# Patient Record
Sex: FEMALE | Race: WHITE | NOT HISPANIC OR LATINO | URBAN - METROPOLITAN AREA
[De-identification: names, ages, dates, MRNs, and addresses within clinical notes are randomized per-mention and may not be internally consistent; named-entity substitution may affect disease eponyms.]

---

## 2021-01-04 ENCOUNTER — IMMUNIZATIONS (OUTPATIENT)
Dept: FAMILY MEDICINE CLINIC | Facility: HOSPITAL | Age: 71
End: 2021-01-04

## 2021-01-04 DIAGNOSIS — Z23 ENCOUNTER FOR IMMUNIZATION: ICD-10-CM

## 2021-01-04 PROCEDURE — 0011A SARS-COV-2 / COVID-19 MRNA VACCINE (MODERNA) 100 MCG: CPT

## 2021-01-04 PROCEDURE — 91301 SARS-COV-2 / COVID-19 MRNA VACCINE (MODERNA) 100 MCG: CPT

## 2021-02-08 ENCOUNTER — IMMUNIZATIONS (OUTPATIENT)
Dept: FAMILY MEDICINE CLINIC | Facility: HOSPITAL | Age: 71
End: 2021-02-08

## 2021-02-08 DIAGNOSIS — Z23 ENCOUNTER FOR IMMUNIZATION: Primary | ICD-10-CM

## 2021-02-08 PROCEDURE — 0012A SARS-COV-2 / COVID-19 MRNA VACCINE (MODERNA) 100 MCG: CPT

## 2021-02-08 PROCEDURE — 91301 SARS-COV-2 / COVID-19 MRNA VACCINE (MODERNA) 100 MCG: CPT

## 2021-10-29 ENCOUNTER — APPOINTMENT (OUTPATIENT)
Dept: URGENT CARE | Facility: CLINIC | Age: 71
End: 2021-10-29

## 2021-10-29 ENCOUNTER — APPOINTMENT (OUTPATIENT)
Dept: LAB | Facility: CLINIC | Age: 71
End: 2021-10-29

## 2021-10-29 DIAGNOSIS — Z02.1 PRE-EMPLOYMENT HEALTH SCREENING EXAMINATION: ICD-10-CM

## 2021-10-29 DIAGNOSIS — Z02.1 PRE-EMPLOYMENT HEALTH SCREENING EXAMINATION: Primary | ICD-10-CM

## 2021-10-29 PROCEDURE — 36415 COLL VENOUS BLD VENIPUNCTURE: CPT

## 2021-10-29 PROCEDURE — 86480 TB TEST CELL IMMUN MEASURE: CPT

## 2021-10-29 PROCEDURE — 86787 VARICELLA-ZOSTER ANTIBODY: CPT

## 2021-10-29 PROCEDURE — 86735 MUMPS ANTIBODY: CPT

## 2021-10-29 PROCEDURE — 86762 RUBELLA ANTIBODY: CPT

## 2021-10-29 PROCEDURE — 86765 RUBEOLA ANTIBODY: CPT

## 2021-10-30 LAB — RUBV IGG SERPL IA-ACNC: >175 IU/ML

## 2021-11-01 LAB
MEV IGG SER QL: NORMAL
MUV IGG SER QL: NORMAL

## 2021-11-02 LAB
GAMMA INTERFERON BACKGROUND BLD IA-ACNC: 0.03 IU/ML
M TB IFN-G BLD-IMP: NEGATIVE
M TB IFN-G CD4+ BCKGRND COR BLD-ACNC: 0 IU/ML
M TB IFN-G CD4+ BCKGRND COR BLD-ACNC: 0 IU/ML
MITOGEN IGNF BCKGRD COR BLD-ACNC: >10 IU/ML
VZV IGG SER IA-ACNC: NORMAL

## 2023-07-18 ENCOUNTER — OFFICE VISIT (OUTPATIENT)
Age: 73
End: 2023-07-18
Payer: COMMERCIAL

## 2023-07-18 VITALS
HEIGHT: 62 IN | HEART RATE: 101 BPM | BODY MASS INDEX: 30.36 KG/M2 | RESPIRATION RATE: 18 BRPM | DIASTOLIC BLOOD PRESSURE: 80 MMHG | TEMPERATURE: 98.8 F | SYSTOLIC BLOOD PRESSURE: 136 MMHG | OXYGEN SATURATION: 96 % | WEIGHT: 165 LBS

## 2023-07-18 DIAGNOSIS — S80.862A INSECT BITE OF LEFT LOWER LEG, INITIAL ENCOUNTER: Primary | ICD-10-CM
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
## 2023-07-18 PROCEDURE — 99203 OFFICE O/P NEW LOW 30 MIN: CPT | Performed by: PHYSICIAN ASSISTANT

## 2023-07-18 RX ORDER — METHYLPREDNISOLONE 4 MG/1
TABLET ORAL
Qty: 21 EACH | Refills: 0 | Status: SHIPPED | OUTPATIENT
Start: 2023-07-18 | End: 2023-07-24

## 2023-07-18 RX ORDER — CEPHALEXIN 500 MG/1
500 CAPSULE ORAL EVERY 8 HOURS SCHEDULED
Qty: 21 CAPSULE | Refills: 0 | Status: SHIPPED | OUTPATIENT
Start: 2023-07-18 | End: 2023-07-25

## 2023-07-18 NOTE — PROGRESS NOTES
[facility]        NAME: Matthew Jeffery is a 68 y.o. female  : 1950    MRN: [id number]  DATE: 2023  TIME: 5:49 PM    Assessment and Plan   Insect bite of left lower leg, initial encounter [S80.105U, W57. XXXA]  1. Insect bite of left lower leg, initial encounter  mupirocin (BACTROBAN) 2 % ointment    cephalexin (KEFLEX) 500 mg capsule    methylPREDNISolone 4 MG tablet therapy pack            Patient Instructions     Patient has what appears to be insect bite of left lower leg. Patient concerned because erythema is migrating. I we will place her on a combination of oral Keflex and a Medrol Dosepak and prescribed her mupirocin ointment. She is to keep the wound otherwise clean and dry. Should be reevaluated if condition persists or worsens despite treatment. Follow up with PCP in 3-5 days. Proceed to  ER if symptoms worsen. Chief Complaint     Chief Complaint   Patient presents with   • Insect Bite     Insect bug since Sat that has been getting bigger. Redness, swelling and pain present          History of Present Illness       Patient presents with what she believes is an insect bite wound on her left lower leg which occurred 3 days ago. She reports the area is red, swollen, and tender but there is no drainage or bleeding, red streaking, fever, chills, or other symptoms. Denies any foreign body or insect remnant. Review of Systems   Review of Systems   Constitutional: Negative. Respiratory: Negative. Cardiovascular: Negative. Gastrointestinal: Negative. Genitourinary: Negative. Skin: Positive for color change and wound.         Possible insect bite left lower leg         Current Medications       Current Outpatient Medications:   •  cephalexin (KEFLEX) 500 mg capsule, Take 1 capsule (500 mg total) by mouth every 8 (eight) hours for 7 days, Disp: 21 capsule, Rfl: 0  •  methylPREDNISolone 4 MG tablet therapy pack, Use as directed on package, Disp: 21 each, Rfl: 0  •  mupirocin (BACTROBAN) 2 % ointment, Apply topically 2 (two) times a day, Disp: 22 g, Rfl: 0    Current Allergies     Allergies as of 07/18/2023   • (No Known Allergies)            The following portions of the patient's history were reviewed and updated as appropriate: allergies, current medications, past family history, past medical history, past social history, past surgical history and problem list.     History reviewed. No pertinent past medical history. History reviewed. No pertinent surgical history. History reviewed. No pertinent family history. Medications have been verified. Objective   /80   Pulse 101   Temp 98.8 °F (37.1 °C) (Tympanic)   Resp 18   Ht 5' 2" (1.575 m)   Wt 74.8 kg (165 lb)   SpO2 96%   BMI 30.18 kg/m²   No LMP recorded. Patient is postmenopausal.       Physical Exam     Physical Exam  Vitals reviewed. Constitutional:       General: She is not in acute distress. Appearance: She is well-developed. Skin:     Comments: Left calf with what appears to be insect bite wound with surrounding erythema and tenderness to palpation. Center wound is red and swollen but there is no active drainage or bleeding and no sign of abscess formation. Neurological:      Mental Status: She is alert and oriented to person, place, and time.

## 2023-07-18 NOTE — PATIENT INSTRUCTIONS
Insect Bite or Sting   WHAT YOU NEED TO KNOW:   Most insect bites and stings are not dangerous and go away without treatment. Your symptoms may be mild, or you may develop anaphylaxis. Anaphylaxis is a sudden, life-threatening reaction that needs immediate treatment. Common examples of insects that bite or sting are bees, ticks, mosquitoes, spiders, and ants. Insect bites or stings can lead to diseases such as malaria, West Nile virus, Lyme disease, or [place] Spotted Fever. DISCHARGE INSTRUCTIONS:   Call your local emergency number (911 in the [address]) for signs or symptoms of anaphylaxis,  such as trouble breathing, swelling in your mouth or throat, or wheezing. You may also have itching, a rash, hives, or feel like you are going to faint. Return to the emergency department if:   You are stung on your tongue or in your throat. A white area forms around the bite. You are sweating badly or have body pain. You think you were bitten or stung by a poisonous insect. Call your doctor if:   You have a fever. The area becomes red, warm, tender, and swollen beyond the area of the bite or sting. You have questions or concerns about your condition or care. Medicines: You may need any of the following:  Antihistamines  decrease itching and rash. Epinephrine  is used to treat severe allergic reactions such as anaphylaxis. Take your medicine as directed. Contact your healthcare provider if you think your medicine is not helping or if you have side effects. Tell your provider if you are allergic to any medicine. Keep a list of the medicines, vitamins, and herbs you take. Include the amounts, and when and why you take them. Bring the list or the pill bottles to follow-up visits. Carry your medicine list with you in case of an emergency. Steps to take for signs or symptoms of anaphylaxis:   Immediately  give 1 shot of epinephrine only into the outer thigh muscle.          Leave the shot in place as directed. Your healthcare provider may recommend you leave it in place for up to 10 seconds before you remove it. This helps make sure all of the epinephrine is delivered. Call 911 and go to the emergency department,  even if the shot improved symptoms. Do not drive yourself. Bring the used epinephrine shot with you. Safety precautions to take if you are at risk for anaphylaxis:   Keep 2 shots of epinephrine with you at all times. You may need a second shot, because epinephrine only works for about 20 minutes and symptoms may return. Your healthcare provider can show you and family members how to give the shot. Check the expiration date every month and replace it before it expires. Create an action plan. Your healthcare provider can help you create a written plan that explains the allergy and an emergency plan to treat a reaction. The plan explains when to give a second epinephrine shot if symptoms return or do not improve after the first. Give copies of the action plan and emergency instructions to family members, work and school staff, and  providers. Show them how to give a shot of epinephrine. Carry medical alert identification. Wear medical alert jewelry or carry a card that says you have an insect allergy. Ask your healthcare provider where to get these items. If an insect bites or stings you:   Remove the stinger. Scrape the stinger out with your fingernail, edge of a credit card, or a knife blade. Do not squeeze the wound. Gently wash the area with soap and water. Remove the tick. Ticks must be removed as soon as possible so you do not get diseases passed through tick bites. Ask your healthcare provider for more information on tick bites and how to remove ticks. Care for a bite or sting wound:   Elevate (raise) the area above the level of your heart, if possible. Prop the area on pillows to keep it raised comfortably.  Elevate the area for 10 to 20 minutes each hour or as directed by your healthcare provider. Use compresses. Soak a clean washcloth in cold water, wring it out, and put it on the bite or sting. Use the compress for 10 to 20 minutes each hour or as directed by your healthcare provider. After 24 to 48 hours, change to warm compresses. Apply a paste. Add water to baking soda to make a thick paste. Put the paste on the area for 5 minutes. Rinse gently to remove the paste. Prevent another insect bite or sting:   Do not wear bright-colored or flower-print clothing when you plan to spend time outdoors. Do not use hairspray, perfumes, or aftershave. Do not leave food out. Empty any standing water and wash container with soap and water every 2 days. Put screens on all open windows and doors. Put insect repellent that contains DEET on skin that is showing when you go outside. Put insect repellent at the top of your boots, bottom of pant legs, and sleeve cuffs. Wear long sleeves, pants, and shoes. Use citronella candles outdoors to help keep mosquitoes away. Put a tick and flea collar on pets. Follow up with your doctor as directed:  Write down your questions so you remember to ask them during your visits. © Copyright Michele Dunn 2022 Information is for End User's use only and may not be sold, redistributed or otherwise used for commercial purposes. The above information is an  only. It is not intended as medical advice for individual conditions or treatments. Talk to your doctor, nurse or pharmacist before following any medical regimen to see if it is safe and effective for you.

## 2023-08-09 ENCOUNTER — OFFICE VISIT (OUTPATIENT)
Age: 73
End: 2023-08-09
Payer: COMMERCIAL

## 2023-08-09 VITALS
HEIGHT: 62 IN | TEMPERATURE: 98.2 F | WEIGHT: 165 LBS | BODY MASS INDEX: 30.36 KG/M2 | SYSTOLIC BLOOD PRESSURE: 138 MMHG | DIASTOLIC BLOOD PRESSURE: 80 MMHG | OXYGEN SATURATION: 97 % | HEART RATE: 107 BPM | RESPIRATION RATE: 18 BRPM

## 2023-08-09 DIAGNOSIS — M54.2 NECK PAIN ON RIGHT SIDE: Primary | ICD-10-CM

## 2023-08-09 PROCEDURE — 99213 OFFICE O/P EST LOW 20 MIN: CPT | Performed by: EMERGENCY MEDICINE

## 2023-08-09 RX ORDER — CYCLOBENZAPRINE HCL 10 MG
10 TABLET ORAL 3 TIMES DAILY PRN
Qty: 20 TABLET | Refills: 0 | Status: SHIPPED | OUTPATIENT
Start: 2023-08-09

## 2023-08-09 NOTE — PATIENT INSTRUCTIONS
Neck Pain   WHAT YOU NEED TO KNOW:   You may have sudden neck pain that increases quickly. You may instead feel pain build slowly over time. Neck pain may go away in a few days or weeks, or it may continue for months. The pain may come and go, or be worse with certain movements. The pain may be only in your neck, or it may move to your arms, back, or shoulders. You may also have pain that starts in another body area and moves to your neck. Some types of neck pain are permanent. DISCHARGE INSTRUCTIONS:   Return to the emergency department if:   You have an injury that causes neck pain and shooting pain down your arms or legs. Your neck pain suddenly becomes severe. You have neck pain along with numbness, tingling, or weakness in your arms or legs. You have a stiff neck, a headache, and a fever. Contact your healthcare provider if:   You have new or worsening symptoms. Your symptoms continue even after treatment. You have questions or concerns about your condition or care. Medicines: You may need any of the following:  NSAIDs  , such as ibuprofen, help decrease swelling, pain, and fever. This medicine is available without a doctor's order. Ask your healthcare provider which medicine to take and how often to take it. Follow directions. NSAIDs can cause stomach bleeding or kidney problems if not taken correctly. If you take blood thinner medicine, always ask if NSAIDs are safe for you. Acetaminophen  helps decrease pain and fever. Ask your healthcare provider how much to take and how often to take it. Follow directions. Acetaminophen can cause liver damage if not taken correctly. Steroid medicine  may be used to reduce inflammation. This can help relieve pain caused by swelling. Take your medicine as directed. Contact your healthcare provider if you think your medicine is not helping or if you have side effects. Tell your provider if you are allergic to any medicine.  Keep a list of the medicines, vitamins, and herbs you take. Include the amounts, and when and why you take them. Bring the list or the pill bottles to follow-up visits. Carry your medicine list with you in case of an emergency. Manage or prevent neck pain:   Rest your neck as directed. Do not make sudden movements, such as turning your head quickly. Your healthcare provider may recommend you wear a cervical collar for a short time. The collar will prevent you from moving your head. This will give your neck time to heal if an injury is causing your neck pain. Ask your provider when you can return to sports or other normal daily activities. Apply heat as directed. Heat helps relieve pain and swelling. Use a heat wrap, or soak a small towel in warm water. Wring out the extra water. Apply the heat wrap or towel for 20 minutes every hour, or as directed. Apply ice as directed. Ice helps relieve pain and swelling, and can help prevent tissue damage. Use an ice pack, or put ice in a bag. Cover the ice pack or back with a towel before you apply it to your neck. Apply the ice pack or ice for 15 minutes every hour, or as directed. Your provider can tell you how often to apply ice. Do neck exercises as directed. Neck exercises help strengthen the muscles and increase range of motion. Your provider will tell you which exercises are right for you. The provider may give you instructions or may recommend that you work with a physical therapist. Your provider or therapist can make sure you are doing the exercises correctly. Maintain good posture. Try to keep your head and shoulders lifted when you sit. If you work in front of a computer, make sure the monitor is at the right level. You should not need to look up down to see the screen. You should also not have to lean forward to be able to read what is on the screen.  Make sure your keyboard, mouse, and other computer items are placed where you do not have to extend your shoulder to reach them. Get up often if you work in front of a computer or sit for long periods of time. Stretch or walk around to keep your neck muscles loose. Follow up with your healthcare provider as directed: Your healthcare provider may refer you to a specialist if your pain does not get better with treatment. Write down your questions so you remember to ask them during your visits. © Copyright Tad Arellano 2022 Information is for End User's use only and may not be sold, redistributed or otherwise used for commercial purposes. The above information is an  only. It is not intended as medical advice for individual conditions or treatments. Talk to your doctor, nurse or pharmacist before following any medical regimen to see if it is safe and effective for you.

## 2023-08-09 NOTE — PROGRESS NOTES
[facility]        NAME: Rylee Blankenhsip is a 68 y.o. female  : 1950    MRN: [id number]  DATE: 2023  TIME: 10:39 AM    Assessment and Plan   Neck pain on right side [M54.2]  1. Neck pain on right side  Ambulatory Referral to Physical Therapy    cyclobenzaprine (FLEXERIL) 10 mg tablet            Patient Instructions     Patient Instructions     Neck Pain   WHAT YOU NEED TO KNOW:   You may have sudden neck pain that increases quickly. You may instead feel pain build slowly over time. Neck pain may go away in a few days or weeks, or it may continue for months. The pain may come and go, or be worse with certain movements. The pain may be only in your neck, or it may move to your arms, back, or shoulders. You may also have pain that starts in another body area and moves to your neck. Some types of neck pain are permanent. DISCHARGE INSTRUCTIONS:   Return to the emergency department if:   • You have an injury that causes neck pain and shooting pain down your arms or legs. • Your neck pain suddenly becomes severe. • You have neck pain along with numbness, tingling, or weakness in your arms or legs. • You have a stiff neck, a headache, and a fever. Contact your healthcare provider if:   • You have new or worsening symptoms. • Your symptoms continue even after treatment. • You have questions or concerns about your condition or care. Medicines: You may need any of the following:  • NSAIDs  , such as ibuprofen, help decrease swelling, pain, and fever. This medicine is available without a doctor's order. Ask your healthcare provider which medicine to take and how often to take it. Follow directions. NSAIDs can cause stomach bleeding or kidney problems if not taken correctly. If you take blood thinner medicine, always ask if NSAIDs are safe for you. • Acetaminophen  helps decrease pain and fever. Ask your healthcare provider how much to take and how often to take it. Follow directions. Acetaminophen can cause liver damage if not taken correctly. • Steroid medicine  may be used to reduce inflammation. This can help relieve pain caused by swelling. • Take your medicine as directed. Contact your healthcare provider if you think your medicine is not helping or if you have side effects. Tell your provider if you are allergic to any medicine. Keep a list of the medicines, vitamins, and herbs you take. Include the amounts, and when and why you take them. Bring the list or the pill bottles to follow-up visits. Carry your medicine list with you in case of an emergency. Manage or prevent neck pain:   • Rest your neck as directed. Do not make sudden movements, such as turning your head quickly. Your healthcare provider may recommend you wear a cervical collar for a short time. The collar will prevent you from moving your head. This will give your neck time to heal if an injury is causing your neck pain. Ask your provider when you can return to sports or other normal daily activities. • Apply heat as directed. Heat helps relieve pain and swelling. Use a heat wrap, or soak a small towel in warm water. Wring out the extra water. Apply the heat wrap or towel for 20 minutes every hour, or as directed. • Apply ice as directed. Ice helps relieve pain and swelling, and can help prevent tissue damage. Use an ice pack, or put ice in a bag. Cover the ice pack or back with a towel before you apply it to your neck. Apply the ice pack or ice for 15 minutes every hour, or as directed. Your provider can tell you how often to apply ice. • Do neck exercises as directed. Neck exercises help strengthen the muscles and increase range of motion. Your provider will tell you which exercises are right for you. The provider may give you instructions or may recommend that you work with a physical therapist. Your provider or therapist can make sure you are doing the exercises correctly. • Maintain good posture. Try to keep your head and shoulders lifted when you sit. If you work in front of a computer, make sure the monitor is at the right level. You should not need to look up down to see the screen. You should also not have to lean forward to be able to read what is on the screen. Make sure your keyboard, mouse, and other computer items are placed where you do not have to extend your shoulder to reach them. Get up often if you work in front of a computer or sit for long periods of time. Stretch or walk around to keep your neck muscles loose. Follow up with your healthcare provider as directed: Your healthcare provider may refer you to a specialist if your pain does not get better with treatment. Write down your questions so you remember to ask them during your visits. © Copyright [facility] 2022 Information is for End User's use only and may not be sold, redistributed or otherwise used for commercial purposes. The above information is an  only. It is not intended as medical advice for individual conditions or treatments. Talk to your doctor, nurse or pharmacist before following any medical regimen to see if it is safe and effective for you. Follow up with PCP in 3-5 days. Proceed to  ER if symptoms worsen. Chief Complaint     Chief Complaint   Patient presents with   • Pain     Neck, right shoulder and right arm pain mainly at night when lying down. Patient hasnt slept          History of Present Illness       Patient complains of gradual onset right neck pain after gardening last week. Pain is gradually worsening especially at night, and now radiates at times down right arm. She has history of sciatica treated with chiropractic but has never had neck pain previously. This been under a great deal of stress due to 's recent diagnosis with cancer.       Review of Systems   Review of Systems   Constitutional: Negative for activity change, chills and fever.   Cardiovascular: Negative for chest pain. Gastrointestinal: Negative for abdominal pain. Musculoskeletal: Positive for neck pain. Negative for arthralgias, back pain, gait problem, joint swelling, myalgias and neck stiffness. Skin: Negative for color change, rash and wound. Neurological: Negative for dizziness, syncope, weakness, light-headedness and headaches. Current Medications       Current Outpatient Medications:   •  cyclobenzaprine (FLEXERIL) 10 mg tablet, Take 1 tablet (10 mg total) by mouth 3 (three) times a day as needed for muscle spasms, Disp: 20 tablet, Rfl: 0  •  mupirocin (BACTROBAN) 2 % ointment, Apply topically 2 (two) times a day (Patient not taking: Reported on 8/9/2023), Disp: 22 g, Rfl: 0    Current Allergies     Allergies as of 08/09/2023   • (No Known Allergies)            The following portions of the patient's history were reviewed and updated as appropriate: allergies, current medications, past family history, past medical history, past social history, past surgical history and problem list.     History reviewed. No pertinent past medical history. History reviewed. No pertinent surgical history. History reviewed. No pertinent family history. Medications have been verified. Objective   /80   Pulse (!) 107   Temp 98.2 °F (36.8 °C) (Tympanic)   Resp 18   Ht 5' 2" (1.575 m)   Wt 74.8 kg (165 lb)   SpO2 97%   BMI 30.18 kg/m²        Physical Exam     Physical Exam  Vitals and nursing note reviewed. Constitutional:       Appearance: She is well-developed. HENT:      Head: Normocephalic and atraumatic. Eyes:      Conjunctiva/sclera: Conjunctivae normal.      Pupils: Pupils are equal, round, and reactive to light. Musculoskeletal:         General: Tenderness present. Cervical back: Normal range of motion and neck supple.       Comments: Tender upper trapezius muscles, rhomboid muscles on the right   Skin:     General: Skin is warm and dry. Findings: No rash. Neurological:      Mental Status: She is alert and oriented to person, place, and time. Deep Tendon Reflexes: Reflexes normal.   Psychiatric:         Mood and Affect: Mood normal.         Behavior: Behavior normal.         Thought Content:  Thought content normal.         Judgment: Judgment normal.

## 2023-08-24 ENCOUNTER — EVALUATION (OUTPATIENT)
Age: 73
End: 2023-08-24
Payer: COMMERCIAL

## 2023-08-24 DIAGNOSIS — M54.2 NECK PAIN ON RIGHT SIDE: Primary | ICD-10-CM

## 2023-08-24 PROCEDURE — 97140 MANUAL THERAPY 1/> REGIONS: CPT | Performed by: PHYSICAL THERAPIST

## 2023-08-24 PROCEDURE — 97161 PT EVAL LOW COMPLEX 20 MIN: CPT | Performed by: PHYSICAL THERAPIST

## 2023-08-24 PROCEDURE — 97110 THERAPEUTIC EXERCISES: CPT | Performed by: PHYSICAL THERAPIST

## 2023-08-24 NOTE — PROGRESS NOTES
PT Evaluation     Today's date: 2023  Patient name: Jeremy Rojas  : 1950  MRN: [id number]  Referring provider: Ken Bae MD  Dx:   Encounter Diagnosis     ICD-10-CM    1. Neck pain on right side  M54.2 Ambulatory Referral to Physical Therapy          Start Time: 1130  Stop Time: 1225  Total time in clinic (min): 55 minutes    Assessment  Assessment details: The patient is a 68year old female 2 months post onset of R neck and scapular pain. Pain range 0-5/10. Pt exhibits forward head posture with increased thoracic kyphosis. No radiating UE symptoms. Mild shoulder weakness noted B, R thumb ext weakness. R cervical paraspinals, suboccipitals, upper trap and rhomboids tender and tight with palpation. Pt wakes with pain at night, pain with reading and typing on computer. NDI 20. Pt would benefit from a course of outpatient PT with goal of decreasing pain, improving posture and postural awarness, and resuming all activity with minimal pain. Impairments: abnormal or restricted ROM, activity intolerance, impaired physical strength and lacks appropriate home exercise program  Functional limitations: wakes with pain, 1-2x/night, pain with reading and computer  Symptom irritability: moderateUnderstanding of Dx/Px/POC: good   Prognosis: good    Goals  STG- 4 weeks 23  1. I HEP  2. Improve patient's awareness of proper posture and body mechanics  3. Decrease pain range to 0-3/10  4. Improve ability to sleep 6-8 hours without waking with pain  5. Increase AROM c-spine to minimal limit all planes  6. Improve NDI 5-10 points    LTG- 8 weeks 10/19/23  1. Decrease R cervical pain to 0-2/10  2. Pt will be able to read and work on computer without apin  3. Improve posture  4.  Improve NDI to under 10%    Plan  Patient would benefit from: PT eval and skilled physical therapy  Referral necessary: No  Planned modality interventions: thermotherapy: hydrocollator packs  Planned therapy interventions: joint mobilization, manual therapy, neuromuscular re-education, postural training, patient education, stretching, therapeutic activities, therapeutic exercise and home exercise program  Frequency: 2x week  Duration in visits: 16  Duration in weeks: 8  Plan of Care beginning date: 2023  Plan of Care expiration date: 10/19/2023  Treatment plan discussed with: patient        Subjective Evaluation    History of Present Illness  Date of onset: 6/15/2023  Mechanism of injury: Pt reports onset of neck pain 2 months ago. Pt seeing chiropractor for back, and getting massage. Pt not taking any meds for neck at this time. Pt referred for outpatient PT. Pt states she has had a recent diagnosis for her  of stage 4 cancer, which she feels has a bearing on her neck pain. Recurrent probem    Quality of life: good    Patient Goals  Patient goals for therapy: increased strength and decreased pain  Patient goal: "be vibrant", get strong  Pain  Current pain rating: 3  At best pain ratin  At worst pain ratin  Quality: tight, pressure and dull ache  Relieving factors: change in position  Aggravating factors: keyboarding (reading, sleeping)    Social Support  Steps to enter house: yes  2  Stairs in house: yes   14  Lives in: multiple-level home  Lives with: spouse    Employment status: working (counselor mental health)  Hand dominance: right      Diagnostic Tests  No diagnostic tests performed  Treatments  Current treatment: physical therapy        Objective     Concurrent Complaints  Positive for night pain and disturbed sleep.      Postural Observations  Seated posture: fair  Standing posture: fair    Additional Postural Observation Details  Exhibits forward head posture with increase thoracic kyphosis noted    Tenderness     Additional Tenderness Details  Tenderness R suboccipitals, R cervical paraspinals and rhomboids    Neurological Testing     Sensation   Cervical/Thoracic   Left   Intact: light touch    Right   Intact: light touch    Active Range of Motion   Cervical/Thoracic Spine       Cervical  Subcranial protraction:  WFL   Subcranial retraction:   Restriction level: minimal  Flexion:  Restriction level: minimal  Extension:  Restriction level: minimal  Left lateral flexion:  Restriction level: minimal  Right lateral flexion:  Restriction level moderate  Left rotation:  Restriction level: minimal  Right rotation:  Restriction level: moderate    Additional Active Range of Motion Details  AROM is not painful, but very stiff with motion to R    Joint Play     Hypomobile: C3, C4, C5, C6 and C7     Strength/Myotome Testing   Cervical Spine     Left   Neck lateral flexion (C3): 4    Right   Neck lateral flexion (C3): 4    Tests   Cervical     Left   Negative Spurling's Test A and Spurling's Test B. Right   Negative Spurling's Test A and Spurling's Test B.      Additional Tests Details  Decrease neck "pressure" with alicia c-traction    General Comments:      Shoulder Comments   B shoulder weakness in flexion 4/5    Wrist/Hand Comments  Thumb ext R 4/5, pinch painful R thumb    Cervical/Thoracic Comments  8/24/23- NDI 20      Flowsheet Rows    Flowsheet Row Most Recent Value   PT/OT G-Codes    Current Score 54   Projected Score 72   FOTO information reviewed Yes   Assessment Type Evaluation             Precautions: R neck pain      Manuals 8/24            STM R upper trap, paraspinals and suboccipitals/stretch/ alicia c-tx CRR                                                   Neuro Re-Ed             Instruction in HEP CRR  AROM c-spine, scapular ROM            Instruction in posture and body mechanics CRR                                                                             Ther Ex             Cervical ROM 5x all planes , no ext            Axial ext 5x supine  5x seated            Shrugs/protract/retract 5x/5x                                                                             Ther Activity Gait Training                                       Modalities

## 2023-08-28 NOTE — PROGRESS NOTES
Daily Note     Today's date: 2023  Patient name: Kimberly Henry  : 1950  MRN: [id number]  Referring provider: Ban Hollins MD  Dx:   Encounter Diagnosis     ICD-10-CM    1. Neck pain on right side  M54.2           Start Time: 1355  Stop Time: 1435  Total time in clinic (min): 40 minutes    Subjective: Pain free at rest, @ worst 4/10. Pain much improved. Objective: See treatment diary below      Assessment: Improved pan and flexibility noted. Progressed with flexibility exercise and progressed with postural/scapular strengthening. Initiated tband ex and wall angels, patient tolerated new ex well, some fatigue but no pain. Plan to continue PT with goal of resuming all activity with minimal pain. Plan: Progress treatment as tolerated.        Precautions: R neck pain      Manuals            STM R upper trap, paraspinals and suboccipitals/stretch/ alicia c-tx CRR CRR  STM R cervical MM, alicia traction, suboccipital release, PA's grade 2-3 R mid cervical segments                                                  Neuro Re-Ed             Instruction in HEP CRR  AROM c-spine, scapular ROM Wall angels, scap strengthening tbands, thread needle, written HEP           Instruction in posture and body mechanics CRR                                                                             Ther Ex             Cervical ROM 5x all planes , no ext 5x all planes           Axial ext 5x supine  5x seated 5x supine  5x seated           Shrugs/protract/retract 5x/5x 10x/10x           Supine rotation L/R  5x L/R                                                               Ther Activity             B shoulder tbands ext/row/"Y"  10x all red           Wall angels  10x           Thread the needle  10x L/R from plank positions           UBE with cervical spinal stabilization   *                       Modalities

## 2023-08-29 ENCOUNTER — OFFICE VISIT (OUTPATIENT)
Age: 73
End: 2023-08-29
Payer: COMMERCIAL

## 2023-08-29 DIAGNOSIS — M54.2 NECK PAIN ON RIGHT SIDE: Primary | ICD-10-CM

## 2023-08-29 PROCEDURE — 97530 THERAPEUTIC ACTIVITIES: CPT | Performed by: PHYSICAL THERAPIST

## 2023-08-29 PROCEDURE — 97110 THERAPEUTIC EXERCISES: CPT | Performed by: PHYSICAL THERAPIST

## 2023-08-29 PROCEDURE — 97140 MANUAL THERAPY 1/> REGIONS: CPT | Performed by: PHYSICAL THERAPIST

## 2023-08-30 NOTE — PROGRESS NOTES
Daily Note     Today's date: 2023  Patient name: Sonu Paulino  : 1950  MRN: [id number]  Referring provider: Jamir Rivas MD  Dx:   Encounter Diagnosis     ICD-10-CM    1. Neck pain on right side  M54.2           Start Time: 1053  Stop Time: 1130  Total time in clinic (min): 37 minutes    Subjective: Pt reports she had to drive extensive distances and is more sore and stiff. She had some muscle spasms last night after driving- in hands and legs. Objective: See treatment diary below      Assessment: Sonu Paulino tolerated today's treatment session well. Patient education provided on correct form and postural correction during TE. Anuj Swartz completed all TE with good form and no adverse reactions. ***  Anuj Swartz continues to benefit from skilled OPPT services to address neck pain . Will continue to address functional deficits and focus on progression of POC per patient tolerance. Patient performed UBE to increase blood flow to the area being treated, prepare the muscles for strength training and stretching, improve overall tolerance to activity, and aerobic endurance. Plan: Continue per plan of care. Progress treatment as tolerated.        Precautions: R neck pain      Manuals 2023          STM R upper trap, paraspinals and suboccipitals/stretch/ alicia c-tx CRR CRR  STM R cervical MM, alicia traction, suboccipital release, PA's grade 2-3 R mid cervical segments                                                  Neuro Re-Ed             Instruction in HEP CRR  AROM c-spine, scapular ROM Wall angels, scap strengthening tbands, thread needle, written HEP           Instruction in posture and body mechanics CRR                                                                             Ther Ex             Cervical ROM 5x all planes , no ext 5x all planes ***          Axial ext 5x supine  5x seated 5x supine  5x seated ***          Shrugs/protract/retract 5x/5x 10x/10x ***          Supine rotation L/R  5x L/R ***          No money    ***                                                 Ther Activity             B shoulder tbands ext/row/"Y"  10x all red Red TB x20 each           Wall angels  10x           Thread the needle  10x L/R from plank positions           UBE with cervical spinal stabilization   Level 1 4'/4'                       Modalities

## 2023-08-31 ENCOUNTER — OFFICE VISIT (OUTPATIENT)
Age: 73
End: 2023-08-31
Payer: COMMERCIAL

## 2023-08-31 DIAGNOSIS — M54.2 NECK PAIN ON RIGHT SIDE: Primary | ICD-10-CM

## 2023-08-31 PROCEDURE — 97530 THERAPEUTIC ACTIVITIES: CPT

## 2023-08-31 PROCEDURE — 97140 MANUAL THERAPY 1/> REGIONS: CPT

## 2023-08-31 PROCEDURE — 97110 THERAPEUTIC EXERCISES: CPT

## 2023-08-31 NOTE — PROGRESS NOTES
Daily Note     Today's date: 2023  Patient name: Jeremy Rojas  : 1950  MRN: [id number]  Referring provider: Ken Bae MD  Dx:   Encounter Diagnosis     ICD-10-CM    1. Neck pain on right side  M54.2           Start Time: 1052  Stop Time: 1130  Total time in clinic (min): 38 minutes    Subjective: Pt reports she had to drive extensive distances and is more sore and stiff. She had some muscle spasms last night after driving- in hands and legs. Objective: See treatment diary below      Assessment: Jeremy Rojas tolerated today's treatment session well. Patient education provided on correct form and postural correction during TE. Ryanne Delacruz completed all TE with good form and no adverse reactions. Ryanne Delacruz continues to benefit from skilled OPPT services to address neck pain . Will continue to address functional deficits and focus on progression of POC per patient tolerance. Patient performed UBE to increase blood flow to the area being treated, prepare the muscles for strength training and stretching, improve overall tolerance to activity, and aerobic endurance. Plan: Continue per plan of care. Progress treatment as tolerated.        Precautions: R neck pain      Manuals 2023          STM R upper trap, paraspinals and suboccipitals/stretch/ alicia c-tx CRR CRR  STM R cervical MM, alicia traction, suboccipital release, PA's grade 2-3 R mid cervical segments STM R cervical MM, alicia traction, suboccipital release, PA's grade 2-3 R mid cervical segments KH                                                 Neuro Re-Ed             Instruction in HEP CRR  AROM c-spine, scapular ROM Wall angels, scap strengthening tbands, thread needle, written HEP           Instruction in posture and body mechanics CRR                                                                             Ther Ex             Cervical ROM 5x all planes , no ext 5x all planes x5 all planes Axial ext 5x supine  5x seated 5x supine  5x seated           Shrugs/protract/retract 5x/5x 10x/10x           Supine rotation L/R  5x L/R           No money    Orange TB x10                                                  Ther Activity             B shoulder tbands ext/row/"Y"  10x all red Red TB x20 each           Wall angels  10x x20          Thread the needle  10x L/R from plank positions x10 each side           UBE with cervical spinal stabilization   Level 1 4'/4'                       Modalities

## 2023-08-31 NOTE — PROGRESS NOTES
Daily Note     Today's date: 2023  Patient name: Sudheer Calhoun  : 1950  MRN: [id number]  Referring provider: Serena Bruno MD  Dx:   Encounter Diagnosis     ICD-10-CM    1. Neck pain on right side  M54.2           Start Time: 1500  Stop Time: 1545  Total time in clinic (min): 45 minutes    Subjective: pt reports more thoracic pain than cervical in the past week      Objective: See treatment diary below      Assessment:Pt tolerated today's treatment session well    held UBE today due to in thoracic pain last session w/ this activity. Pt challenged with wall angels-maintaining proper positioning throughout mov't. Pt completed exer with no adverse reactions  . Pt felt positive relief of sx with manual interventions of cerv and thor spine-felt much better after session. .   Pt continues to benefit from skilled PT services. Will continue to encourage HEP and PT attendance while addressing pt's  functional deficits & focusing on progression of POC as patient tolerates. Patient performed UBE aerobic exercise to increase blood flow to the area being treated, prepare the muscles for strength training and stretching, improve overall tolerance to activity, and aerobic endurance. Plan: Continue per plan of care. Precautions: R neck pain      Manuals 2023         STM R upper trap, paraspinals and suboccipitals/stretch/ alicia c-tx CRR CRR  STM R cervical MM, alicai traction, suboccipital release, PA's grade 2-3 R mid cervical segments STM R cervical MM, alicia traction, suboccipital release, PA's grade 2-3 R mid cervical segments KH AE  STM R cervical MM, alicia traction, , CPA/UPAs grade 3-4 R>Lmid cervical segments    Prone thoracic UPA/CPA and transverse glides grade 3.                                                 Neuro Re-Ed             Instruction in HEP CRR  AROM c-spine, scapular ROM Wall angels, scap strengthening tbands, thread needle, written HEP Instruction in posture and body mechanics CRR                                                                             Ther Ex             Cervical ROM 5x all planes , no ext 5x all planes x5 all planes  x5 all planes         Axial ext 5x supine  5x seated 5x supine  5x seated  x10 @ wall         Shrugs/protract/retract 5x/5x 10x/10x  10x/10x         Supine rotation L/R  5x L/R           No money    Orange TB x10  red tubing x 10                                                Ther Activity             B shoulder tbands ext/row/"Y"  10x all red Red TB x20 each  Red TB x10 each         Wall angels  10x x20 2x10         Thread the needle  10x L/R from plank positions x10 each side  x10 ea side         UBE with cervical spinal stabilization   Level 1 4'/4' [facility] [place] pain                       Modalities

## 2023-09-05 ENCOUNTER — OFFICE VISIT (OUTPATIENT)
Age: 73
End: 2023-09-05
Payer: COMMERCIAL

## 2023-09-05 DIAGNOSIS — M54.2 NECK PAIN ON RIGHT SIDE: Primary | ICD-10-CM

## 2023-09-05 PROCEDURE — 97530 THERAPEUTIC ACTIVITIES: CPT

## 2023-09-05 PROCEDURE — 97140 MANUAL THERAPY 1/> REGIONS: CPT

## 2023-09-05 PROCEDURE — 97110 THERAPEUTIC EXERCISES: CPT

## 2023-09-07 ENCOUNTER — OFFICE VISIT (OUTPATIENT)
Age: 73
End: 2023-09-07
Payer: COMMERCIAL

## 2023-09-07 DIAGNOSIS — M54.2 NECK PAIN ON RIGHT SIDE: Primary | ICD-10-CM

## 2023-09-07 PROCEDURE — 97110 THERAPEUTIC EXERCISES: CPT

## 2023-09-07 PROCEDURE — 97140 MANUAL THERAPY 1/> REGIONS: CPT

## 2023-09-07 NOTE — PROGRESS NOTES
Daily Note     Today's date: 2023  Patient name: Vladimir Ramirez  : 1950  MRN: [id number]  Referring provider: Maricruz Friend MD  Dx:   Encounter Diagnosis     ICD-10-CM    1. Neck pain on right side  M54.2                      Subjective: Patient reports continued discomfort in upper thoracic area. Radiates into right shoulder pain. Objective: See treatment diary below      Assessment:Pt tolerated today's treatment session well    held UBE today due to in thoracic pain last session w/ this activity. Initiated thoracic mobility for rotation, extension and scapular depression exercises. Updated HEP as noted to continue at home. Patient verbalizes understanding. Pt completed exer with no adverse reactions  . Pt felt positive relief of sx with manual interventions of cerv and thor spine-felt much better after session. .   Pt continues to benefit from skilled PT services. Will continue to encourage HEP and PT attendance while addressing pt's  functional deficits & focusing on progression of POC as patient tolerates. Patient performed UBE aerobic exercise to increase blood flow to the area being treated, prepare the muscles for strength training and stretching, improve overall tolerance to activity, and aerobic endurance. Plan: Continue per plan of care. Precautions: R neck pain    Access Code: [email or web address]  URL: [email or web address]  Date: 2023  Prepared by: Jinny Sánchez    Exercises  - Sidelying Thoracic Lumbar Rotation  - 1 x daily - 7 x weekly - 2 sets - 10 reps - 10 s hold  - Prone Chest Stretch on Chair  - 1 x daily - 7 x weekly - 2 sets - 10 reps - 10 s hold  - Seated Thoracic Extension Arms Overhead  - 1 x daily - 7 x weekly - 3 sets - 10 reps - 5 s hold  - Standing Scapular Depression and Elevation with Arm Overhead  - 1 x daily - 7 x weekly - 3 sets - 10 reps  - Supine Lower Trunk Rotation  - 1 x daily - 7 x weekly - 3 sets - 10 reps  - Seated Scapular Retraction  - 1 x daily - 7 x weekly - 3 sets - 10 reps  - Quadruped Full Range Thoracic Rotation with Reach  - 1 x daily - 7 x weekly - 2 sets - 10 reps      Manuals 8/24 8/28 8/31/2023 9/5/23 9/7/23        STM R upper trap, paraspinals and suboccipitals/stretch/ alicia c-tx CRR CRR  STM R cervical MM, alicia traction, suboccipital release, PA's grade 2-3 R mid cervical segments STM R cervical MM, alicia traction, suboccipital release, PA's grade 2-3 R mid cervical segments KH AE  STM R cervical MM, alicia traction, , CPA/UPAs grade 3-4 R>Lmid cervical segments    Prone thoracic UPA/CPA and transverse glides grade 3. KSG    STM R cervical MM, alicia traction, , CPA/UPAs grade 3-4 R>Lmid cervical segments    Prone thoracic UPA/CPA and transverse glides grade 3.                                                Neuro Re-Ed             Instruction in HEP CRR  AROM c-spine, scapular ROM Wall angels, scap strengthening tbands, thread needle, written HEP           Instruction in posture and body mechanics CRR                                                                             Ther Ex             Cervical ROM 5x all planes , no ext 5x all planes x5 all planes  x5 all planes         Axial ext 5x supine  5x seated 5x supine  5x seated  x10 @ wall x10 seated in chair        Shrugs/protract/retract 5x/5x 10x/10x  10x/10x 10x/10x        Supine rotation L/R  5x L/R           No money    Orange TB x10  red tubing x 10 Pink TB x20        Open book stretch thoracic rotation     x10 ea side        Seated thoracic extension with 1/2 foam roll     2x10        Kneeling lat stretch on foam  and elbows on table     10" x10        Scapular depression with arms overhead against wall     10" x10        LTR     2x10                                  Ther Activity             B shoulder tbands ext/row/"Y"  10x all red Red TB x20 each  Red TB x10 each         Wall angels  10x x20 2x10 2x10        Thread the needle  10x L/R from plank positions x10 each side  x10 ea side x10 ea side        UBE with cervical spinal stabilization   Level 1 4'/4' [facility] [place] pain                       Modalities

## 2023-09-10 NOTE — PROGRESS NOTES
Daily Note     Today's date: 2023  Patient name: Adriano Bustillo  : 1950  MRN: [id number]  Referring provider: Payal Fitzpatrick MD  Dx:   Encounter Diagnosis     ICD-10-CM    1. Neck pain on right side  M54.2           Start Time: 1515  Stop Time: 1600  Total time in clinic (min): 45 minutes    Subjective: Current neck pain 2/10, thoracic pain 5/10      Objective: See treatment diary below      Assessment: Progressing with cervical/thoracic ROM/mobilization and strengthening program.  Continue to work on self mobilization techniques, and patient pleased with use of home ex program.  Sleeping better. Tolerated all exercise without difficulty. Plan to continue PT with goal of resuming all activity with minimal pain. Plan: Progress treatment as tolerated. Precautions: R neck pain    Access Code: [email or web address]  URL: [email or web address]  Date: 2023  Prepared by: Kaz Sharma    Exercises  - Sidelying Thoracic Lumbar Rotation  - 1 x daily - 7 x weekly - 2 sets - 10 reps - 10 s hold  - Prone Chest Stretch on Chair  - 1 x daily - 7 x weekly - 2 sets - 10 reps - 10 s hold  - Seated Thoracic Extension Arms Overhead  - 1 x daily - 7 x weekly - 3 sets - 10 reps - 5 s hold  - Standing Scapular Depression and Elevation with Arm Overhead  - 1 x daily - 7 x weekly - 3 sets - 10 reps  - Supine Lower Trunk Rotation  - 1 x daily - 7 x weekly - 3 sets - 10 reps  - Seated Scapular Retraction  - 1 x daily - 7 x weekly - 3 sets - 10 reps  - Quadruped Full Range Thoracic Rotation with Reach  - 1 x daily - 7 x weekly - 2 sets - 10 reps      Manuals 2023       STM R upper trap, paraspinals and suboccipitals/stretch/ alicia c-tx CRR CRR  STM R cervical MM, alicia traction, suboccipital release, PA's grade 2-3 R mid cervical segments STM R cervical MM, alicia traction, suboccipital release, PA's grade 2-3 R mid cervical segments KH AE  STM R cervical MM, alicia traction, , CPA/UPAs grade 3-4 R>Lmid cervical segments    Prone thoracic UPA/CPA and transverse glides grade 3. KSG    STM R cervical MM, alicia traction, , CPA/UPAs grade 3-4 R>Lmid cervical segments    Prone thoracic UPA/CPA and transverse glides grade 3.  CRR    STM R cervical MM, alicia tx, PA's grade 2-3 R mid cervical segments    Prone thoracic PA's spinous processes, and transeveres grade 3                                              Neuro Re-Ed             Instruction in HEP CRR  AROM c-spine, scapular ROM Wall angels, scap strengthening tbands, thread needle, written HEP           Instruction in posture and body mechanics CRR                                                                             Ther Ex             Cervical ROM 5x all planes , no ext 5x all planes x5 all planes  x5 all planes  5x       Axial ext 5x supine  5x seated 5x supine  5x seated  x10 @ wall x10 seated in chair 10x       Shrugs/protract/retract 5x/5x 10x/10x  10x/10x 10x/10x 10x/10x       Supine rotation L/R  5x L/R           No money    Orange TB x10  red tubing x 10 Pink TB x20 20x PTB       Open book stretch thoracic rotation     x10 ea side 10x L/R       Seated thoracic extension with 1/2 foam roll     2x10 2 x 10       Kneeling lat stretch on foam  and elbows on table     10" x10 10x 10"       Scapular depression with arms overhead against wall     10" x10 10x 10"       LTR     2x10 20x                                 Ther Activity             B shoulder tbands ext/row/"Y"  10x all red Red TB x20 each  Red TB x10 each  10x/10x/10x RTB       Wall angels  10x x20 2x10 2x10 20x       Thread the needle  10x L/R from plank positions x10 each side  x10 ea side x10 ea side 10x L/R       UBE with cervical spinal stabilization   Level 1 4'/4' [facility] [place] pain   -                    Modalities

## 2023-09-11 ENCOUNTER — OFFICE VISIT (OUTPATIENT)
Age: 73
End: 2023-09-11
Payer: COMMERCIAL

## 2023-09-11 DIAGNOSIS — M54.2 NECK PAIN ON RIGHT SIDE: Primary | ICD-10-CM

## 2023-09-11 PROCEDURE — 97140 MANUAL THERAPY 1/> REGIONS: CPT | Performed by: PHYSICAL THERAPIST

## 2023-09-11 PROCEDURE — 97110 THERAPEUTIC EXERCISES: CPT | Performed by: PHYSICAL THERAPIST

## 2023-09-14 ENCOUNTER — APPOINTMENT (OUTPATIENT)
Age: 73
End: 2023-09-14
Payer: COMMERCIAL

## 2023-09-18 ENCOUNTER — OFFICE VISIT (OUTPATIENT)
Age: 73
End: 2023-09-18
Payer: COMMERCIAL

## 2023-09-18 DIAGNOSIS — M54.2 NECK PAIN ON RIGHT SIDE: Primary | ICD-10-CM

## 2023-09-18 PROCEDURE — 97110 THERAPEUTIC EXERCISES: CPT | Performed by: PHYSICAL THERAPIST

## 2023-09-18 PROCEDURE — 97140 MANUAL THERAPY 1/> REGIONS: CPT | Performed by: PHYSICAL THERAPIST

## 2023-09-18 NOTE — PROGRESS NOTES
Daily Note     Today's date: 2023  Patient name: Jeremy Rojas  : 1950  MRN: [id number]  Referring provider: Ken Bae MD  Dx:   Encounter Diagnosis     ICD-10-CM    1. Neck pain on right side  M54.2           Start Time: 1745  Stop Time: 1830  Total time in clinic (min): 45 minutes    Subjective:  Neck pain 0/10, lower thoracic spine 2-6/10      Objective: See treatment diary below      Assessment:Progressing with thoracic spine mobs and stretch, improved after PT. Notes mid back stiffness with activity. Thoracic stiffness persists. Plan to continue PT with goal of increasing activity level with minimal pain. Plan: Progress treatment as tolerated. Precautions: R neck pain    Access Code: [email or web address]  URL: [email or web address]  Date: 2023  Prepared by: Melvi Ansari    Exercises  - Sidelying Thoracic Lumbar Rotation  - 1 x daily - 7 x weekly - 2 sets - 10 reps - 10 s hold  - Prone Chest Stretch on Chair  - 1 x daily - 7 x weekly - 2 sets - 10 reps - 10 s hold  - Seated Thoracic Extension Arms Overhead  - 1 x daily - 7 x weekly - 3 sets - 10 reps - 5 s hold  - Standing Scapular Depression and Elevation with Arm Overhead  - 1 x daily - 7 x weekly - 3 sets - 10 reps  - Supine Lower Trunk Rotation  - 1 x daily - 7 x weekly - 3 sets - 10 reps  - Seated Scapular Retraction  - 1 x daily - 7 x weekly - 3 sets - 10 reps  - Quadruped Full Range Thoracic Rotation with Reach  - 1 x daily - 7 x weekly - 2 sets - 10 reps      Manuals 2023      STM R upper trap, paraspinals and suboccipitals/stretch/ alicia c-tx CRR CRR  STM R cervical MM, alicia traction, suboccipital release, PA's grade 2-3 R mid cervical segments STM R cervical MM, alicia traction, suboccipital release, PA's grade 2-3 R mid cervical segments KH AE  STM R cervical MM, alicia traction, , CPA/UPAs grade 3-4 R>Lmid cervical segments    Prone thoracic UPA/CPA and transverse glides grade 3. KSG    STM R cervical MM, alicia traction, , CPA/UPAs grade 3-4 R>Lmid cervical segments    Prone thoracic UPA/CPA and transverse glides grade 3.  CRR    STM R cervical MM, alicia tx, PA's grade 2-3 R mid cervical segments    Prone thoracic PA's spinous processes, and transeveres grade 3 CRR    STMCervical and throacic musculature, grade 3 mid thoracic transver=se and spinous process PA's                                             Neuro Re-Ed             Instruction in HEP CRR  AROM c-spine, scapular ROM Wall angels, scap strengthening tbands, thread needle, written HEP           Instruction in posture and body mechanics CRR                                                                             Ther Ex             Cervical ROM 5x all planes , no ext 5x all planes x5 all planes  x5 all planes  5x 5x      Axial ext 5x supine  5x seated 5x supine  5x seated  x10 @ wall x10 seated in chair 10x 10x      Shrugs/protract/retract 5x/5x 10x/10x  10x/10x 10x/10x 10x/10x 10x/10x      Supine rotation L/R  5x L/R           No money    Orange TB x10  red tubing x 10 Pink TB x20 20x PTB 20x  PTB      Open book stretch thoracic rotation     x10 ea side 10x L/R HEP      Seated thoracic extension with 1/2 foam roll     2x10 2 x 10 2 x 10      Kneeling lat stretch on foam  and elbows on table     10" x10 10x 10" 10x 10"      Scapular depression with arms overhead against wall     10" x10 10x 10" 10x 10"      LTR     2x10 20x -      heather pose       2 x 20"    L/R 2 x 20"                   Ther Activity             B shoulder tbands ext/row/"Y"  10x all red Red TB x20 each  Red TB x10 each  10x/10x/10x RTB 10x/10x/10x      Wall angels  10x x20 2x10 2x10 20x 20x      Thread the needle  10x L/R from plank positions x10 each side  x10 ea side x10 ea side 10x L/R 10x L/R      UBE with cervical spinal stabilization   Level 1 4'/4' HOLd inc thor pain   -                    Modalities

## 2023-09-21 ENCOUNTER — APPOINTMENT (OUTPATIENT)
Age: 73
End: 2023-09-21
Payer: COMMERCIAL

## 2023-09-25 ENCOUNTER — APPOINTMENT (OUTPATIENT)
Age: 73
End: 2023-09-25
Payer: COMMERCIAL

## 2023-09-28 ENCOUNTER — APPOINTMENT (OUTPATIENT)
Age: 73
End: 2023-09-28
Payer: COMMERCIAL

## 2025-05-08 ENCOUNTER — OFFICE VISIT (OUTPATIENT)
Age: 75
End: 2025-05-08
Payer: COMMERCIAL

## 2025-05-08 VITALS
OXYGEN SATURATION: 98 % | DIASTOLIC BLOOD PRESSURE: 74 MMHG | HEIGHT: 62 IN | SYSTOLIC BLOOD PRESSURE: 128 MMHG | BODY MASS INDEX: 30.87 KG/M2 | RESPIRATION RATE: 20 BRPM | WEIGHT: 167.77 LBS | TEMPERATURE: 99.8 F | HEART RATE: 89 BPM

## 2025-05-08 DIAGNOSIS — M54.32 LEFT SIDED SCIATICA: Primary | ICD-10-CM

## 2025-05-08 PROCEDURE — 99213 OFFICE O/P EST LOW 20 MIN: CPT

## 2025-05-08 RX ORDER — METHYLPREDNISOLONE 4 MG/1
TABLET ORAL
Qty: 21 TABLET | Refills: 0 | Status: SHIPPED | OUTPATIENT
Start: 2025-05-08

## 2025-05-08 NOTE — PROGRESS NOTES
[facility]        NAME: Salome Avalos is a 75 y.o. female  : 1950    MRN: [id number]  DATE: May 8, 2025  TIME: 2:46 PM    Assessment and Plan   Left sided sciatica [M54.32]  1. Left sided sciatica  Ambulatory Referral to [facility]    methylPREDNISolone 4 MG tablet therapy pack      Ice, heat, gentle stretching. Tylenol while on steroid pack. Discussed that symptoms do correlate with sciatica. Follow up with comprehensive spine program. Patient agreeable.   Patient Instructions     Follow up with PCP in 3-5 days.  Proceed to ER if symptoms worsen.    If tests have been performed at [facility], our office will contact you with results if changes need to be made to the care plan discussed with you at the visit.  You can review your full results on [facility] MyChart.    Chief Complaint     Chief Complaint   Patient presents with    Left Leg pain     For 2 weeks, having left leg pain that starts in the hip goes down the left leg and into the left knee. Feels as if it is her sciatica.  Left knee if swollen. Has seen chiropractor for adjustments, vibration machine and iced the area, relief is temporary. OTC Advil doesn't help.     History of Present Illness     Pt is a 75-year-old female presenting complaining of left leg pain that starts in the hip and goes down the back and side of the leg and into the knee. She notes a hx of sciatica and feels these symptoms are similar. She has tried the chiropractor, ice, and advil with minimal relief. She notes her gait is affected.  She denies any new injuries at this time.      Review of Systems   Review of Systems   Constitutional:  Negative for chills and fever.   HENT: Negative.     Eyes: Negative.    Respiratory: Negative.     Cardiovascular: Negative.    Gastrointestinal: Negative.    Genitourinary: Negative.    Musculoskeletal:  Positive for myalgias. Negative for arthralgias, back pain and joint swelling.   Skin:  Negative for color change  "and rash.   Neurological: Negative.    All other systems reviewed and are negative.    Current Medications       Current Outpatient Medications:     methylPREDNISolone 4 MG tablet therapy pack, Use as directed on package, Disp: 21 tablet, Rfl: 0    Tirzepatide 7.5 MG/0.5ML SOAJ, Inject 1 SYRINGE (2.5mg) subcutaneously (under the skin) once weekly ON THE SAME DAY. Rotate injection sites., Disp: , Rfl:     cyclobenzaprine (FLEXERIL) 10 mg tablet, Take 1 tablet (10 mg total) by mouth 3 (three) times a day as needed for muscle spasms (Patient not taking: Reported on 8/24/2023), Disp: 20 tablet, Rfl: 0    mupirocin (BACTROBAN) 2 % ointment, Apply topically 2 (two) times a day (Patient not taking: Reported on 8/9/2023), Disp: 22 g, Rfl: 0    Current Allergies     Allergies as of 05/08/2025    (No Known Allergies)            The following portions of the patient's history were reviewed and updated as appropriate: allergies, current medications, past family history, past medical history, past social history, past surgical history and problem list.     History reviewed. No pertinent past medical history.    History reviewed. No pertinent surgical history.    Family History   Problem Relation Age of Onset    Cancer Mother     Lung disease Father     COPD Father          Medications have been verified.        Objective   /74   Pulse 89   Temp 99.8 °F (37.7 °C) (Tympanic)   Resp 20   Ht 5' 2\" (1.575 m)   Wt 76.1 kg (167 lb 12.3 oz)   SpO2 98%   BMI 30.69 kg/m²   No LMP recorded. Patient is postmenopausal.       Physical Exam     Physical Exam  Vitals and nursing note reviewed.   Constitutional:       General: She is not in acute distress.     Appearance: Normal appearance. She is normal weight. She is not ill-appearing, toxic-appearing or diaphoretic.   HENT:      Head: Normocephalic and atraumatic.      Right Ear: External ear normal.      Left Ear: External ear normal.      Nose: Nose normal.      Mouth/Throat: "      Mouth: Mucous membranes are moist.   Eyes:      Conjunctiva/sclera: Conjunctivae normal.   Cardiovascular:      Rate and Rhythm: Normal rate.      Pulses: Normal pulses.   Pulmonary:      Effort: Pulmonary effort is normal.      Breath sounds: Normal breath sounds.   Musculoskeletal:         General: Tenderness present. No swelling or deformity. Normal range of motion.      Cervical back: Normal range of motion.      Lumbar back: Normal. No swelling, edema, deformity, signs of trauma, lacerations, spasms, tenderness or bony tenderness. Normal range of motion. No scoliosis.      Left hip: Tenderness present. No deformity, lacerations, bony tenderness or crepitus. Normal range of motion.      Left upper leg: No swelling, edema, deformity, lacerations, tenderness or bony tenderness.        Legs:       Comments: No midline or lumbar back tenderness   Skin:     General: Skin is warm and dry.      Capillary Refill: Capillary refill takes less than 2 seconds.   Neurological:      General: No focal deficit present.      Mental Status: She is alert. Mental status is at baseline.   Psychiatric:         Mood and Affect: Mood normal.         Behavior: Behavior normal.

## 2025-05-19 ENCOUNTER — EVALUATION (OUTPATIENT)
Age: 75
End: 2025-05-19
Payer: COMMERCIAL

## 2025-05-19 DIAGNOSIS — M25.552 LEFT HIP PAIN: ICD-10-CM

## 2025-05-19 DIAGNOSIS — M25.562 LEFT KNEE PAIN, UNSPECIFIED CHRONICITY: ICD-10-CM

## 2025-05-19 DIAGNOSIS — M54.32 SCIATICA OF LEFT SIDE: Primary | ICD-10-CM

## 2025-05-19 PROCEDURE — 97162 PT EVAL MOD COMPLEX 30 MIN: CPT

## 2025-05-19 PROCEDURE — 97112 NEUROMUSCULAR REEDUCATION: CPT

## 2025-05-19 NOTE — HOME EXERCISE EDUCATION
Program_ID:[id number]   Access Code: [email or web address]  URL: [email or web address]  Date: 05-  Prepared By: Gordy Mohan    Program Notes      Exercises      - Supine Figure 4 Piriformis Stretch - 2 x daily - 7 x weekly - 1 sets - 5 reps - :20 hold      - Seated Piriformis Stretch with Trunk Bend - 2 x daily - 7 x weekly - 1 sets - 5 reps - :20 hold      - Standing Hip Abduction with Counter Support - 1 x daily - 7 x weekly - 2 sets - 10 reps      - Supine Active Straight Leg Raise - 1 x daily - 7 x weekly - 2 sets - 10 reps

## 2025-05-19 NOTE — PROGRESS NOTES
PT Evaluation     Today's date: 2025  Patient name: Salome Avalos  : 1950  MRN: [id number]  Referring provider: Geri Gonzalez PA-C  Dx:   Encounter Diagnosis     ICD-10-CM    1. Sciatica of left side  M54.32       2. Left hip pain  M25.552       3. Left knee pain, unspecified chronicity  M25.562           Start Time: 1604  Stop Time: 1700  Total time in clinic (min): 56 minutes    Assessment  Impairments: abnormal gait, abnormal muscle tone, abnormal or restricted ROM, activity intolerance, impaired balance, impaired physical strength, lacks appropriate home exercise program, pain with function and endurance  Functional limitations: going down stairs, sit to stands, standing/walking > 10 minutes, gardening, getting in/out of car    Assessment details: Salome Avalos is a 75 y.o. year old female who presents to outpatient physical therapy with a primary diagnosis of LLE pain. Referral has a diagnosis listed as sciatica which she does show some sciatic nerve mobility limitations but also has other symptoms and impairments. She currently has L Knee AROM deficits, tenderness to palpation in the L piriformis and m/l joint lines of the knee, decreasred BLE strength, impaired balance, and decreased overall functional capacity. They present with the previously stated deficits which limit their ability to participate in ADL's, perform duties needed for work, participate in recreational activities, and perform tasks around the home. For example, she currently has difficulty with going down stairs, sit to stands, standing/walking > 10 minutes, gardening, and getting in/out of car. Salome is currently functioning below their prior level of function and is a good rehab candidate who would benefit from skilled outpatient physical therapy services to allow them to reach their goals and return to PLOF, participate more fully in daily activities, and have an improved quality of life.    Physical therapist  assistant (PTA) may be utilized to administer treatments as appropriate and in accordance with [facility] Physical Therapy Practice Act.  Barriers to therapy: Multiple treatment areas  Understanding of Dx/Px/POC: good     Prognosis: good    Goals  STG: To be completed in 4 weeks from 5/19/2025:  1. Salome will be independent with basic HEP to allow pt to complete exercises safely when not directly supervised during PT session.    2. Salome will report 50% improvement in symptoms compared to start of POC to indicate functional improvement and decrease level of disability.    3. Salome will report pain no worse than 4/10 at worst to indicate decreased pain level and improved tolerance to activity.   4. Salome will tolerate 10 minutes of continuous activity at a time with no increase in pain to indicate improved tolerance to activity.   5. Salome will demonstrate ability to reciprocally negotiate steps without pain increase  6. Salome will improve BLE strength to at least 4+/5 to allow for greater participation with transfers, gait, and ADLs.       LTG: To be completed in 8 weeks from 5/19/2025 or upon discharge from PT:  1. Salome will be independent with advanced home exercise program to allow patient to transition from physical therapy care to continue with plan of care at home without supervision from therapist and continue to progress with rehabilitation.   2. Salome will report pain no worse than 2/10 at worst to indicate decreased pain level and improved tolerance to activity.  3. Salome will report 75% improvement in symptoms compared to start of POC to indicate functional improvement and decrease level of disability.    4. Salome will tolerate 30 minutes of consecutive activity at a time with no increase in symptoms to indicate improvement with functional mobility.   5. Salome will demonstrate gross 5/5 strength in BLE for improved stability of joint and indicate improvement in functional  strength.    6. Salome will demonstrate improved L Knee ROM to 0-130 to allow for greater ability to negotiate stairs and perform leisure activities such as gardening.      Plan  Patient would benefit from: skilled physical therapy and PT eval  Planned modality interventions: biofeedback, cryotherapy, neuromuscular electric stimulation, TENS, thermotherapy: hydrocollator packs, unattended electrical stimulation and ultrasound    Planned therapy interventions: IASTM, joint mobilization, kinesiology taping, manual therapy, massage, Kaminski taping, balance, nerve gliding, neuromuscular re-education, strengthening, stretching, therapeutic activities, therapeutic exercise, therapeutic training, home exercise program, graded activity, gait training, functional ROM exercises and flexibility    Frequency: 2x week  Duration in weeks: 8  Plan of Care beginning date: 5/19/2025  Plan of Care expiration date: 7/14/2025  Treatment plan discussed with: patient      Subjective Evaluation    History of Present Illness  Mechanism of injury: Pt is a 75-year-old female presenting complaining of left leg pain that starts in the hip and can goes down the back and side of the leg and into the knee. She denies any specific mechanism of injury. Pain is reportedly ongoing for about 3 weeks now. She notes a hx of sciatica and feels these symptoms are similar. She has tried the chiropractor, ice, and advil with minimal relief. She notes her gait is affected. She admits that she did trip over a rug since her symptoms have started but denies any injury.     She went to Urgent Care on 5/8 and was given a methylprednisone pack and a referral to OPPT. She reports that the steroid did help a lot but is wearing off.   Quality of life: good    Patient Goals  Patient goals for therapy: increased strength, decreased pain, independence with ADLs/IADLs, improved balance, return to sport/leisure activities and increased motion  Patient goal: be able to  travel to without impairment and pain-free  Pain  Current pain ratin  At best pain ratin  At worst pain ratin  Location: L Hip to L knee  Quality: pulling  Relieving factors: medications, rest, ice and heat  Aggravating factors: sitting (going down stairs, sit to stands, standing/walking > 10 minutes, gardening, getting in/out of car)    Social Support  Steps to enter house: yes  1  Stairs in house: yes   13  Lives in: multiple-level home  Lives with: alone    Exercise history: intends to start using treadmill and vibration machine    Treatments  Previous treatment: chiropractic and massage        Objective     Palpation   Left   Muscle spasm in the piriformis.   Tenderness of the piriformis.   Trigger point to piriformis.     Right   Tenderness of the piriformis.     Additional Palpation Details  Bilateral ITB tenderness to palpation with L>R    Tenderness     Left Hip   Tenderness in the sacroiliac joint.     Right Hip   No tenderness in the sacroiliac joint.     Lumbar Screen  Lumbar range of motion within normal limits.    Active Range of Motion     Lumbar   Extension:  WFL  Left lateral flexion:  WFL  Right lateral flexion:  WFL  Left rotation:  WFL  Right rotation:  WFL  Left Hip   Flexion: WFL    Right Hip   Flexion: WFL  Left Knee   Flexion: 111 degrees   Extension: 5 degrees     Right Knee   Flexion: 130 degrees   Extension: 0 degrees     Mobility   Patellar Mobility:   Left Knee   Hypomobile: left medial, left lateral, left superior and left inferior    Right Knee   WFL: medial, lateral, superior and inferior    Joint Play   Left Hip   Joints within functional limits are the long axis distraction.     Additional Joint Play Details  LAD on the LLE relieved back tightness    Strength/Myotome Testing     Left Hip   Planes of Motion   Flexion: 4  Abduction: 4-  External rotation: 4-  Internal rotation: 4-    Right Hip   Planes of Motion   Flexion: 4+  Abduction: 4  External rotation: 4  Internal  rotation: 4    Left Knee   Flexion: 4+  Extension: 4  Quadriceps contraction: good    Right Knee   Flexion: 4+  Extension: 4+  Quadriceps contraction: good    Tests     Left Hip   Positive piriformis.   Negative AKOSUA and FADIR.   90/90 SLR: Negative.   SLR: Negative.     Swelling     Left Knee Girth Measurement (cm)   Joint line: 42.5 cm    Right Knee Girth Measurement (cm)   Joint line: 39 cm    Ambulation     Ambulation: Level Surfaces   Ambulation with assistive device: independent    Observational Gait   Gait: antalgic   Decreased walking speed and stride length.              Precautions: n/a      Manuals 5/19             #1                                                   Neuro Re-Ed             Education MF  HEP/POC/biomechanics/pathophysiology            [facility] Step Overs NV                                                                             Ther Ex             Std Hip ABD HEP            Supine Figure 4 Stretch HEP            Seated Figure 4 Stretch HEP            Supine SLR HEP            Knee Flexion Stretch on Step NV                                                   Ther Activity             Nustep NV                         Gait Training                                       Modalities

## 2025-05-19 NOTE — HOME EXERCISE EDUCATION
Program_ID:[id number]   Access Code: [email or web address]  URL: [email or web address]  Date: 05-  Prepared By: Gordy Mohan    Program Notes      Exercises      - Supine Figure 4 Piriformis Stretch - 2 x daily - 7 x weekly - 1 sets - 5 reps - :20 hold      - Seated Piriformis Stretch with Trunk Bend - 2 x daily - 7 x weekly - 1 sets - 5 reps - :20 hold      - Standing Hip Abduction with Counter Support - 1 x daily - 7 x weekly - 2 sets - 10 reps      - Supine Active Straight Leg Raise - 1 x daily - 7 x weekly - 2 sets - 10 reps

## 2025-05-21 ENCOUNTER — OFFICE VISIT (OUTPATIENT)
Age: 75
End: 2025-05-21
Payer: COMMERCIAL

## 2025-05-21 DIAGNOSIS — M25.552 LEFT HIP PAIN: ICD-10-CM

## 2025-05-21 DIAGNOSIS — M54.32 SCIATICA OF LEFT SIDE: Primary | ICD-10-CM

## 2025-05-21 DIAGNOSIS — M25.562 LEFT KNEE PAIN, UNSPECIFIED CHRONICITY: ICD-10-CM

## 2025-05-21 PROCEDURE — 97530 THERAPEUTIC ACTIVITIES: CPT

## 2025-05-21 PROCEDURE — 97112 NEUROMUSCULAR REEDUCATION: CPT

## 2025-05-21 PROCEDURE — 97110 THERAPEUTIC EXERCISES: CPT

## 2025-05-21 NOTE — PROGRESS NOTES
"Daily Note     Today's date: 2025  Patient name: Salome Avalos  : 1950  MRN: [id number]  Referring provider: Geri Gonzalez PA-C  Dx:   Encounter Diagnosis     ICD-10-CM    1. Sciatica of left side  M54.32       2. Left hip pain  M25.552       3. Left knee pain, unspecified chronicity  M25.562           Start Time: 1606  Stop Time: 1710  Total time in clinic (min): 64 minutes    Subjective: Patient reports that she feels \"a little better\" since Monday's IE. She notes no current pain but did have some increased discomfort with sitting a work event today and found herself having to shift positions every so often. She reports good compliance with her HEP.       Objective: See treatment diary below      Assessment: Salome tolerated today's treatment session well. Salome completed all exercises with good form and no adverse reactions. Patient education provided on continuation of POC and TE and progression of HEP. Patient required slight verbal cueing throughout session for proper form, for example no to externally rotate with standing hip abduction. Patient able to progress exercises today with inclusion of leg press, sidestepping, and step ups with no c/o pain. Salome continues to benefit from skilled OPPT services to address range of motion/strength/balance/gait impairments. PT will continue to address functional deficits  and focus on progression of POC per patient tolerance.      Patient performed Nustep to increase blood flow to the area being treated, prepare the muscles for strength training and stretching, improve overall tolerance to activity, and aerobic endurance.       Plan: Continue per plan of care.  Progress treatment as tolerated.       Precautions: n/a      Manuals             #1 #2                                                  Neuro Re-Ed             Education MF  HEP/POC/biomechanics/pathophysiology MF- HEP           [facility] Step O[facility] NV 4 laps with 1 HHA         " "  Lateral [facility] Step Overs  4 laps with no hands                                                               Ther Ex             Std Hip ABD HEP 2 x 10           Supine Figure 4 Stretch HEP 5 x :15 b/l           Seated Figure 4 Stretch HEP 5 x :15 b/l           Supine SLR HEP 2 x 10           Knee Flexion Stretch on Step NV 10 x :05 L Knee           Calf Board  10 x :10           Std HS Curls  2 x 10                        Ther Activity             Nustep NV Seat 6, Lv 7, 8'           Leg Press  #65 2 x 10           Step Ups  2 x 10 on 6\"           Sidestepping  Yellow CO x 3 short laps with HHA           Gait Training                                       Modalities                                                   "

## 2025-05-28 ENCOUNTER — OFFICE VISIT (OUTPATIENT)
Age: 75
End: 2025-05-28
Payer: COMMERCIAL

## 2025-05-28 DIAGNOSIS — M25.562 LEFT KNEE PAIN, UNSPECIFIED CHRONICITY: ICD-10-CM

## 2025-05-28 DIAGNOSIS — M25.552 LEFT HIP PAIN: ICD-10-CM

## 2025-05-28 DIAGNOSIS — M54.32 SCIATICA OF LEFT SIDE: Primary | ICD-10-CM

## 2025-05-28 PROCEDURE — 97110 THERAPEUTIC EXERCISES: CPT

## 2025-05-28 PROCEDURE — 97112 NEUROMUSCULAR REEDUCATION: CPT

## 2025-05-28 PROCEDURE — 97530 THERAPEUTIC ACTIVITIES: CPT

## 2025-05-28 NOTE — PROGRESS NOTES
Daily Note     Today's date: 2025  Patient name: Salome Avalos  : 1950  MRN: [id number]  Referring provider: Geri Gonzalez PA-C  Dx:   Encounter Diagnosis     ICD-10-CM    1. Sciatica of left side  M54.32       2. Left hip pain  M25.552       3. Left knee pain, unspecified chronicity  M25.562           Start Time: 1613  Stop Time: 1655  Total time in clinic (min): 42 minutes    Subjective: Patient reports that she went for massage on Monday and felt great after it. She states that her symptoms seem to be improving.       Objective: See treatment diary below      Assessment: Salome tolerated today's treatment session well. Salome completed all exercises with good form and no adverse reactions. Patient education provided on continuation of POC and TE and progression of HEP. Patient performed cone step overs with better performance today with only 2 bouts where she needed to use the handrail. Patient did have slight sharp pain with lateral step overs today. Salome continues to benefit from skilled OPPT services to address range of motion/strength/balance/gait impairments. PT will continue to address functional deficits  and focus on progression of POC per patient tolerance.      Patient performed Nustep to increase blood flow to the area being treated, prepare the muscles for strength training and stretching, improve overall tolerance to activity, and aerobic endurance.       Plan: Continue per plan of care.  Progress treatment as tolerated.       Precautions: n/a      Manuals            #1 #2 #3                                                 Neuro Re-Ed             Education MF  HEP/POC/biomechanics/pathophysiology MF- HEP           Cone Step Overs NV 4 laps with 1 HHA 3 laps with no HHA          Lateral Cone Step Overs  4 laps with no hands 3 laps with no HHA                                                              Ther Ex             Std Hip ABD HEP 2 x 10 2 x 10         "  Supine Figure 4 Stretch HEP 5 x :15 b/l HEP          Seated Figure 4 Stretch HEP 5 x :15 b/l HEP          Supine SLR HEP 2 x 10           Knee Flexion Stretch on Step NV 10 x :05 L Knee 10 x :05 L Knee          Calf Board  10 x :10 10 x :10          Std HS Curls  2 x 10 2 x 10                       Ther Activity             Nustep NV Seat 6, Lv 7, 8' Seat 6, Lv 7, 8'          Leg Press  #65 2 x 10 75# 2 x 10          Step Ups  2 x 10 on 6\" 8\" with 1 HHA 2 x 10 ea          Sidestepping  Yellow CO x 3 short laps with HHA Yellow CO x 3 short laps with HHA          Anti-Rotation Walkouts   Purple COB 1 x 10          Gait Training                                       Modalities                                                     "

## 2025-05-29 ENCOUNTER — OFFICE VISIT (OUTPATIENT)
Age: 75
End: 2025-05-29
Payer: COMMERCIAL

## 2025-05-29 DIAGNOSIS — M25.562 LEFT KNEE PAIN, UNSPECIFIED CHRONICITY: ICD-10-CM

## 2025-05-29 DIAGNOSIS — M25.552 LEFT HIP PAIN: ICD-10-CM

## 2025-05-29 DIAGNOSIS — M54.32 SCIATICA OF LEFT SIDE: Primary | ICD-10-CM

## 2025-05-29 PROCEDURE — 97112 NEUROMUSCULAR REEDUCATION: CPT

## 2025-05-29 PROCEDURE — 97110 THERAPEUTIC EXERCISES: CPT

## 2025-05-29 PROCEDURE — 97530 THERAPEUTIC ACTIVITIES: CPT

## 2025-05-29 NOTE — PROGRESS NOTES
Daily Note     Today's date: 2025  Patient name: Salome Avalos  : 1950  MRN: [id number]  Referring provider: Geri Gonzalez PA-C  Dx:   Encounter Diagnosis     ICD-10-CM    1. Sciatica of left side  M54.32       2. Left hip pain  M25.552       3. Left knee pain, unspecified chronicity  M25.562           Start Time: 1614  Stop Time: 1701  Total time in clinic (min): 47 minutes    Subjective: Patient reports no soreness after last visit. She admits 4/10 pain in the L knee today.       Objective: See treatment diary below      Assessment: Salome tolerated today's treatment session well. Salome completed all exercises with no adverse reactions. Patient education provided on updated HEP with printout given and progression of POC and exercises. Patient required verbal cueing for proper exercise form and contact guard assistance for balance activities. Salome continues to benefit from skilled OPPT services to address BLE strength deficits and balance impairment. PT will continue to address functional deficits and focus on progression of POC per patient tolerance.      Patient performed Nustep to increase blood flow to the area being treated, prepare the muscles for strength training and stretching, improve overall tolerance to activity, and aerobic endurance.      Plan: Continue per plan of care.  Progress treatment as tolerated.       Precautions: n/a      Manuals           #1 #2 #3 #4                                                Neuro Re-Ed             Education MF  HEP/POC/biomechanics/pathophysiology MF- HEP  MF- HEP update         Cone Step Overs NV 4 laps with 1 HHA 3 laps with no HHA          Lateral Cone Step Overs  4 laps with no hands 3 laps with no HHA          Sidestepping w/ Blaze Pods    3 x :30         SLS with Blaze Pods    4 x :30         Obstacle Course    Cone step overs, tandem walk on airex, Bosu step up x 2 full laps                      Ther Ex            "  Std Hip ABD HEP 2 x 10 2 x 10          Supine Figure 4 Stretch HEP 5 x :15 b/l HEP          Seated Figure 4 Stretch HEP 5 x :15 b/l HEP 10 x :10         Supine SLR HEP 2 x 10           Knee Flexion Stretch on Step NV 10 x :05 L Knee 10 x :05 L Knee          Calf Board  10 x :10 10 x :10 10 x :10         Std HS Curls  2 x 10 2 x 10                       Ther Activity             Nustep NV Seat 6, Lv 7, 8' Seat 6, Lv 7, 8' Seat 6, Lv 5, 8'         Leg Press  #65 2 x 10 75# 2 x 10 85# 2 x 10         Step Ups  2 x 10 on 6\" 8\" with 1 HHA 2 x 10 ea          Sidestepping  Yellow CO x 3 short laps with HHA Yellow CO x 3 short laps with HHA Yellow CO x 3 short laps with HHA         Anti-Rotation Walkouts   Purple COB 1 x 10 HEP         Gait Training                                       Modalities                                                       "

## 2025-06-02 ENCOUNTER — OFFICE VISIT (OUTPATIENT)
Age: 75
End: 2025-06-02
Payer: COMMERCIAL

## 2025-06-02 DIAGNOSIS — M54.32 SCIATICA OF LEFT SIDE: Primary | ICD-10-CM

## 2025-06-02 DIAGNOSIS — M25.562 LEFT KNEE PAIN, UNSPECIFIED CHRONICITY: ICD-10-CM

## 2025-06-02 DIAGNOSIS — M25.552 LEFT HIP PAIN: ICD-10-CM

## 2025-06-02 PROCEDURE — 97112 NEUROMUSCULAR REEDUCATION: CPT

## 2025-06-02 PROCEDURE — 97530 THERAPEUTIC ACTIVITIES: CPT

## 2025-06-02 PROCEDURE — 97110 THERAPEUTIC EXERCISES: CPT

## 2025-06-02 NOTE — PROGRESS NOTES
Daily Note     Today's date: 2025  Patient name: Salome Avalos  : 1950  MRN: [id number]  Referring provider: Geri Gonzalez PA-C  Dx:   Encounter Diagnosis     ICD-10-CM    1. Sciatica of left side  M54.32       2. Left hip pain  M25.552       3. Left knee pain, unspecified chronicity  M25.562           Start Time: 1615  Stop Time: 1712  Total time in clinic (min): 57 minutes    Subjective: Patient reports 85% improvement since starting PT. She reports that she continues to have pain in her L knee with going down stairs.       Objective: See treatment diary below      Assessment: Salome tolerated today's treatment session well. Salome completed all exercises with no adverse reactions. Patient education provided on continued plan of care and progression of POC and exercises. Patient required stand by assistance for balance activities such as the obstacle course. Patient has pain with step down activity with LLE just in the knee. Educated patient on a step to pattern with leading with the LLE. Salome continues to benefit from skilled OPPT services to address BLE strength deficits and balance impairment. PT will continue to address functional deficits and focus on progression of POC per patient tolerance.      Patient performed Nustep to increase blood flow to the area being treated, prepare the muscles for strength training and stretching, improve overall tolerance to activity, and aerobic endurance.       Plan: Continue per plan of care.  Progress treatment as tolerated.       Precautions: n/a      Manuals  6         #1 #2 #3 #4 #5                                               Neuro Re-Ed             Education MF  HEP/POC/biomechanics/pathophysiology MF- HEP  MF- HEP update         Cone Step Overs NV 4 laps with 1 HHA 3 laps with no HHA          Lateral Cone Step Overs  4 laps with no hands 3 laps with no HHA          Sidestepping w/ Blaze Pods    3 x :30 3 x :30        SLS  "with Blaze Pods    4 x :30 4 x :30        Obstacle Course    Cone step overs, tandem walk on airex, Bosu step up x 2 full laps Tandem walking on airex with cones, 6\" step up, diagonal airex steps        TKE     Purple 2 x 10 LLE        Hip HIkes     1 x 15 b/l        Ther Ex             Std Hip ABD HEP 2 x 10 2 x 10          Supine Figure 4 Stretch HEP 5 x :15 b/l HEP          Seated Figure 4 Stretch HEP 5 x :15 b/l HEP 10 x :10         Supine SLR HEP 2 x 10           Knee Flexion Stretch on Step NV 10 x :05 L Knee 10 x :05 L Knee  10 x :05 L Knee        Calf Board  10 x :10 10 x :10 10 x :10 10 x :10        Std HS Curls  2 x 10 2 x 10                       Ther Activity             Nustep NV Seat 6, Lv 7, 8' Seat 6, Lv 7, 8' Seat 6, Lv 5, 8' Seat 6, Lv 5, 8'        Leg Press  #65 2 x 10 75# 2 x 10 85# 2 x 10 85# 2 x 10        Step Ups  2 x 10 on 6\" 8\" with 1 HHA 2 x 10 ea  8\" with 1 HHA 3 x 10 ea        Sidestepping  Yellow CO x 3 short laps with HHA Yellow CO x 3 short laps with HHA Yellow CO x 3 short laps with HHA Yellow CO x 3 short laps with HHA        Anti-Rotation Walkouts   Purple COB 1 x 10 HEP         Step Downs     Pain with 2\"        Gait Training                                       Modalities                                                         "

## 2025-06-05 ENCOUNTER — APPOINTMENT (OUTPATIENT)
Age: 75
End: 2025-06-05
Payer: COMMERCIAL

## 2025-06-09 ENCOUNTER — APPOINTMENT (OUTPATIENT)
Age: 75
End: 2025-06-09
Payer: COMMERCIAL

## 2025-06-13 ENCOUNTER — OFFICE VISIT (OUTPATIENT)
Age: 75
End: 2025-06-13
Payer: COMMERCIAL

## 2025-06-13 DIAGNOSIS — M25.562 LEFT KNEE PAIN, UNSPECIFIED CHRONICITY: ICD-10-CM

## 2025-06-13 DIAGNOSIS — M25.552 LEFT HIP PAIN: ICD-10-CM

## 2025-06-13 DIAGNOSIS — M54.32 SCIATICA OF LEFT SIDE: Primary | ICD-10-CM

## 2025-06-13 PROCEDURE — 97112 NEUROMUSCULAR REEDUCATION: CPT

## 2025-06-13 PROCEDURE — 97530 THERAPEUTIC ACTIVITIES: CPT

## 2025-06-13 PROCEDURE — 97110 THERAPEUTIC EXERCISES: CPT

## 2025-06-13 NOTE — PROGRESS NOTES
Daily Note     Today's date: 2025  Patient name: Salome Avalos  : 1950  MRN: [id number]  Referring provider: Geri Gonzalez PA-C  Dx:   Encounter Diagnosis     ICD-10-CM    1. Sciatica of left side  M54.32       2. Left hip pain  M25.552       3. Left knee pain, unspecified chronicity  M25.562           Start Time: 902  Stop Time: 945  Total time in clinic (min): 43 minutes    Subjective: Patient reports that she was doing very well and her symptoms were gone. However, she reports that last Friday/Saturday she dug 4 holes to plant elvis bushes and now has been having R sided sciatic symptoms since early this week from her R lower back to the R knee. She notes her pain is a 2-3/10 today. She states that walking, bending, and steps seem to aggravate it the most.       Objective: See treatment diary below      Assessment: Salome tolerated today's treatment session well. Salome completed all exercises with no adverse reactions. Patient education provided on continued plan of care and updated HEP with printout given. Modified program today due to patient's increase in symptoms on the RLE now. Updated HeP given to patient until next visit. Salome continues to benefit from skilled OPPT services to address BLE strength deficits, gait abnormalities, and balance impairment. PT will continue to address functional deficits and focus on progression of POC per patient tolerance.      Patient performed Nustep to increase blood flow to the area being treated, prepare the muscles for strength training and stretching, improve overall tolerance to activity, and aerobic endurance.       Plan: Continue per plan of care.  Progress treatment as tolerated.       Precautions: n/a      Manuals  6/        #1 #2 #3 #4 #5 #6                                              Neuro Re-Ed             Education   HEP/POC/biomechanics/pathophysiology - HEP  MF- HEP update    HEP update, heat/ice use    "    Cone Step Overs NV 4 laps with 1 HHA 3 laps with no HHA          Lateral Cone Step Overs  4 laps with no hands 3 laps with no HHA          Sidestepping w/ Blaze Pods    3 x :30 3 x :30        SLS with Blaze Pods    4 x :30 4 x :30        Obstacle Course    Cone step overs, tandem walk on airex, Bosu step up x 2 full laps Tandem walking on airex with cones, 6\" step up, diagonal airex steps        TKE     Purple 2 x 10 LLE        Hip HIkes     1 x 15 b/l        Sciatic Nerve Tensioners      1 x 10 b/l       Ther Ex             Std Hip ABD HEP 2 x 10 2 x 10          Supine Figure 4 Stretch HEP 5 x :15 b/l HEP   5 x :20 b/l       Seated Figure 4 Stretch HEP 5 x :15 b/l HEP 10 x :10         Supine SLR HEP 2 x 10           Knee Flexion Stretch on Step NV 10 x :05 L Knee 10 x :05 L Knee  10 x :05 L Knee        Calf Board  10 x :10 10 x :10 10 x :10 10 x :10        Std HS Curls  2 x 10 2 x 10          Std HS Stretch      5 x :20 b/l       DKTC with pball and LTRS w/ pball      2 min ea       Ther Activity             Nustep NV Seat 6, Lv 7, 8' Seat 6, Lv 7, 8' Seat 6, Lv 5, 8' Seat 6, Lv 5, 8' Seat 6, Lv 1, 8'       Leg Press  #65 2 x 10 75# 2 x 10 85# 2 x 10 85# 2 x 10        Step Ups  2 x 10 on 6\" 8\" with 1 HHA 2 x 10 ea  8\" with 1 HHA 3 x 10 ea        Sidestepping  Yellow CO x 3 short laps with HHA Yellow CO x 3 short laps with HHA Yellow CO x 3 short laps with HHA Yellow CO x 3 short laps with HHA        Anti-Rotation Walkouts   Purple COB 1 x 10 HEP         Step Downs     Pain with 2\"        Gait Training                                       Modalities             MH      During Nustep                                        " Mauc Instructions: By selecting yes to the question below the MAUC number will be added into the note.  This will be calculated automatically based on the diagnosis chosen, the size entered, the body zone selected (H,M,L) and the specific indications you chose. You will also have the option to override the Mohs AUC if you disagree with the automatically calculated number and this option is found in the Case Summary tab.

## 2025-06-13 NOTE — HOME EXERCISE EDUCATION
Program_ID:[id number]   Access Code: [email or web address]  URL: [email or web address]  Date: 06-  Prepared By: Gordy Mohan    Program Notes      Exercises      - Supine Figure 4 Piriformis Stretch - 2 x daily - 7 x weekly - 1 sets - 5 reps - :20 hold      - Seated Piriformis Stretch with Trunk Bend - 2 x daily - 7 x weekly - 1 sets - 5 reps - :20 hold      - Standing Hip Abduction with Counter Support - 1 x daily - 7 x weekly - 2 sets - 10 reps      - Standing Hamstring Stretch with Step - 2 x daily - 7 x weekly - 5 sets - 5 reps - :20 hold      - Supine Lumbar Rotation with Swiss Ball - 2 x daily - 7 x weekly - 3 sets - 10 reps      - Seated Sciatic Tensioner - 2 x daily - 7 x weekly - 2 sets - 10 reps - 5 hold

## 2025-06-13 NOTE — HOME EXERCISE EDUCATION
Program_ID:[id number]   Access Code: [email or web address]  URL: [email or web address]  Date: 06-  Prepared By: Gordy Mohan    Program Notes      Exercises      - Supine Figure 4 Piriformis Stretch - 2 x daily - 7 x weekly - 1 sets - 5 reps - :20 hold      - Seated Piriformis Stretch with Trunk Bend - 2 x daily - 7 x weekly - 1 sets - 5 reps - :20 hold      - Standing Hip Abduction with Counter Support - 1 x daily - 7 x weekly - 2 sets - 10 reps      - Standing Hamstring Stretch with Step - 2 x daily - 7 x weekly - 5 sets - 5 reps - :20 hold      - Supine Lumbar Rotation with Swiss Ball - 2 x daily - 7 x weekly - 3 sets - 10 reps      - Seated Sciatic Tensioner - 2 x daily - 7 x weekly - 2 sets - 10 reps - 5 hold

## 2025-06-16 ENCOUNTER — APPOINTMENT (OUTPATIENT)
Age: 75
End: 2025-06-16
Payer: COMMERCIAL

## 2025-06-16 ENCOUNTER — OFFICE VISIT (OUTPATIENT)
Age: 75
End: 2025-06-16
Payer: COMMERCIAL

## 2025-06-16 VITALS
DIASTOLIC BLOOD PRESSURE: 80 MMHG | OXYGEN SATURATION: 98 % | RESPIRATION RATE: 18 BRPM | WEIGHT: 158 LBS | HEART RATE: 87 BPM | SYSTOLIC BLOOD PRESSURE: 130 MMHG | HEIGHT: 62 IN | BODY MASS INDEX: 29.08 KG/M2 | TEMPERATURE: 98.7 F

## 2025-06-16 DIAGNOSIS — M25.561 CHRONIC PAIN OF RIGHT KNEE: Primary | ICD-10-CM

## 2025-06-16 DIAGNOSIS — G89.29 CHRONIC PAIN OF RIGHT KNEE: Primary | ICD-10-CM

## 2025-06-16 PROCEDURE — 99213 OFFICE O/P EST LOW 20 MIN: CPT | Performed by: NURSE PRACTITIONER

## 2025-06-16 RX ORDER — PREDNISONE 20 MG/1
40 TABLET ORAL DAILY
Qty: 10 TABLET | Refills: 0 | Status: SHIPPED | OUTPATIENT
Start: 2025-06-16 | End: 2025-06-21

## 2025-06-16 NOTE — PATIENT INSTRUCTIONS
Prednisone as directed  Tylenol for pain while taking Prednisone  Elevate and ice  Ace wrap  Follow up with PCP or Ortho if not improving

## 2025-06-16 NOTE — PROGRESS NOTES
[facility]        NAME: Salome Avalos is a 75 y.o. female  : 1950    MRN: [id number]  DATE: 2025  TIME: 4:46 PM    Assessment and Plan   Chronic pain of right knee [M25.561, G89.29]  1. Chronic pain of right knee  predniSONE 20 mg tablet        Discussed with patient likely flair of chronic condition, will start short course of steroids. Discussed supportive care and return precautions. Ace wrap given. Patient/Parent agreeable to plan of care and all questions answered. Patient will follow up with Ortho or PCP if not improving.    Patient Instructions       Follow up with PCP in 3-5 days.  Proceed to  ER if symptoms worsen.    If tests have been performed at [facility], our office will contact you with results if changes need to be made to the care plan discussed with you at the visit.  You can review your full results on [facility].    Chief Complaint     Chief Complaint   Patient presents with   • Knee Pain     Right knee pain. Hx of patellar tendonitis, does PT for it for bilateral knees. Was given steroids in past and it helped. Took advil.          History of Present Illness       Started: 2-3 days  Location: right knee  Injury: gardening, reports was using a shovel consistently  Denies numbness, tingling, loss of sensation, LOC  Treatment: advil today  Reports hx of right sided sciatica and patellar tendonitis  Reports swelling to lateral knee  Pain is mostly with ambulation and stairs        Review of Systems   Review of Systems   Musculoskeletal:  Positive for arthralgias, joint swelling and myalgias.         Current Medications     Current Medications[1]    Current Allergies     Allergies as of 2025 - Reviewed 2025   Allergen Reaction Noted   • Epinephrine Other (See Comments) 2013            The following portions of the patient's history were reviewed and updated as appropriate: allergies, current medications, past family history, past medical  "history, past social history, past surgical history and problem list.     Past Medical History[2]    Past Surgical History[3]    Family History[4]      Medications have been verified.        Objective   /80 (Patient Position: Sitting)   Pulse 87   Temp 98.7 °F (37.1 °C)   Resp 18   Ht 5' 2\" (1.575 m)   Wt 71.7 kg (158 lb)   SpO2 98%   BMI 28.90 kg/m²   No LMP recorded. Patient is postmenopausal.       Physical Exam     Physical Exam  Constitutional:       General: She is not in acute distress.     Appearance: Normal appearance. She is not ill-appearing.   HENT:      Head: Normocephalic and atraumatic.     Cardiovascular:      Rate and Rhythm: Normal rate.   Pulmonary:      Effort: Pulmonary effort is normal.     Musculoskeletal:      Right knee: Swelling present. No erythema, ecchymosis, bony tenderness or crepitus. Normal range of motion. Tenderness present over the lateral joint line.     Skin:     General: Skin is warm and dry.     Neurological:      Mental Status: She is alert.                          [1]    Current Outpatient Medications:   •  predniSONE 20 mg tablet, Take 2 tablets (40 mg total) by mouth daily for 5 days, Disp: 10 tablet, Rfl: 0  •  Tirzepatide 7.5 MG/0.5ML SOAJ, , Disp: , Rfl:   •  cyclobenzaprine (FLEXERIL) 10 mg tablet, Take 1 tablet (10 mg total) by mouth 3 (three) times a day as needed for muscle spasms (Patient not taking: Reported on 5/19/2025), Disp: 20 tablet, Rfl: 0  •  mupirocin (BACTROBAN) 2 % ointment, Apply topically 2 (two) times a day (Patient not taking: Reported on 8/9/2023), Disp: 22 g, Rfl: 0[2]  No past medical history on file.[3]  No past surgical history on file.[4]  Family History  Problem Relation Name Age of Onset   • Cancer Mother     • Lung disease Father     • COPD Father     "

## 2025-06-30 ENCOUNTER — OFFICE VISIT (OUTPATIENT)
Age: 75
End: 2025-06-30
Payer: COMMERCIAL

## 2025-06-30 DIAGNOSIS — M54.32 SCIATICA OF LEFT SIDE: Primary | ICD-10-CM

## 2025-06-30 DIAGNOSIS — M25.552 LEFT HIP PAIN: ICD-10-CM

## 2025-06-30 DIAGNOSIS — M25.562 LEFT KNEE PAIN, UNSPECIFIED CHRONICITY: ICD-10-CM

## 2025-06-30 PROCEDURE — 97110 THERAPEUTIC EXERCISES: CPT

## 2025-06-30 PROCEDURE — 97530 THERAPEUTIC ACTIVITIES: CPT

## 2025-06-30 PROCEDURE — 97112 NEUROMUSCULAR REEDUCATION: CPT

## 2025-06-30 NOTE — HOME EXERCISE EDUCATION
Program_ID:[id number]   Access Code: [email or web address]  URL: [email or web address]  Date: 06-  Prepared By: Gordy Mohan    Program Notes      Exercises      - Supine Figure 4 Piriformis Stretch - 2 x daily - 7 x weekly - 1 sets - 5 reps - :20 hold      - Seated Piriformis Stretch with Trunk Bend - 2 x daily - 7 x weekly - 1 sets - 5 reps - :20 hold      - Standing Hip Abduction with Counter Support - 1 x daily - 7 x weekly - 2 sets - 10 reps      - Standing Hamstring Stretch with Step - 2 x daily - 7 x weekly - 5 sets - 5 reps - :20 hold      - Supine Lumbar Rotation with Swiss Ball - 2 x daily - 7 x weekly - 3 sets - 10 reps      - Seated Sciatic Tensioner - 2 x daily - 7 x weekly - 2 sets - 10 reps - 5 hold      - Side Stepping with Resistance at Ankles - 1 x daily - 7 x weekly - 1 sets - 3 reps

## 2025-06-30 NOTE — PROGRESS NOTES
Daily Note     Today's date: 2025  Patient name: Salome Avalos  : 1950  MRN: [id number]  Referring provider: Geri Gonzalez PA-C  Dx:   Encounter Diagnosis     ICD-10-CM    1. Sciatica of left side  M54.32       2. Left hip pain  M25.552       3. Left knee pain, unspecified chronicity  M25.562           Start Time: 1700  Stop Time: 1743  Total time in clinic (min): 43 minutes    Subjective: Patient reports that she feels better after finishing a Prednisone pack for her R knee pain. She states that it is still bothersome with going down stairs and rates it a 4/10 today.      Objective: See treatment diary below      Assessment: Salome tolerated today's treatment session well. Salome completed all exercises with no adverse reactions. Patient education provided on continued plan of care. Patient required verbal cueing for proper exercise form. Patient with stated 5/10 fatigue at end of session. Able to tolerate hip/knee strengthening focused exercises today without increase in R knee pain. Salome continues to benefit from skilled OPPT services to address BLE strength deficits and balance impairment. PT will continue to address functional deficits and focus on progression of POC per patient tolerance.      Patient performed Nustep to increase blood flow to the area being treated, prepare the muscles for strength training and stretching, improve overall tolerance to activity, and aerobic endurance.       Plan: Continue per plan of care.  Progress treatment as tolerated.       Precautions: n/a      Manuals  6/       #1 #2 #3 #4 #5 #6 #7                                             Neuro Re-Ed             Education MF  HEP/POC/biomechanics/pathophysiology MF- HEP  MF- HEP update  MF  HEP update, heat/ice use MF  HEP update      Cone Step Overs NV 4 laps with 1 HHA 3 laps with no HHA          Lateral Cone Step Overs  4 laps with no hands 3 laps with no HHA         "  Sidestepping w/ Blaze Pods    3 x :30 3 x :30        SLS with Blaze Pods    4 x :30 4 x :30        Obstacle Course    Cone step overs, tandem walk on airex, Bosu step up x 2 full laps Tandem walking on airex with cones, 6\" step up, diagonal airex steps        TKE     Purple 2 x 10 LLE        Hip HIkes     1 x 15 b/l        Marching Walking       2 laps without HHA, 2# ankle weights       Ther Ex             Std Hip ABD HEP 2 x 10 2 x 10    2 x 10 b/l 2#      Supine Figure 4 Stretch HEP 5 x :15 b/l HEP   5 x :20 b/l       Seated Figure 4 Stretch HEP 5 x :15 b/l HEP 10 x :10         Supine SLR HEP 2 x 10           Knee Flexion Stretch on Step NV 10 x :05 L Knee 10 x :05 L Knee  10 x :05 L Knee  10 x :05 L Knee      Calf Board  10 x :10 10 x :10 10 x :10 10 x :10  10 x: 10      Std HS Curls  2 x 10 2 x 10    2 x 10 b/l, 2#      Std HS Stretch      5 x :20 b/l 5 x :20 b/l      DKTC with pball and LTRS w/ pball      2 min ea       Ther Activity             Nustep NV Seat 6, Lv 7, 8' Seat 6, Lv 7, 8' Seat 6, Lv 5, 8' Seat 6, Lv 5, 8' Seat 6, Lv 1, 8' Seat 6, Lv 1, 8'      Leg Press  #65 2 x 10 75# 2 x 10 85# 2 x 10 85# 2 x 10  75# 2 x 10, 85# 1 x 10      Step Ups  2 x 10 on 6\" 8\" with 1 HHA 2 x 10 ea  8\" with 1 HHA 3 x 10 ea        Sidestepping  Yellow CO x 3 short laps with HHA Yellow CO x 3 short laps with HHA Yellow CO x 3 short laps with HHA Yellow CO x 3 short laps with HHA  Given as HEP      Anti-Rotation Walkouts   Purple COB 1 x 10 HEP         Step Downs     Pain with 2\"        Gait Training                                       Modalities             MH      During Nustep                                          "

## 2025-07-07 ENCOUNTER — OFFICE VISIT (OUTPATIENT)
Age: 75
End: 2025-07-07
Payer: COMMERCIAL

## 2025-07-07 DIAGNOSIS — M54.32 SCIATICA OF LEFT SIDE: Primary | ICD-10-CM

## 2025-07-07 DIAGNOSIS — M25.562 LEFT KNEE PAIN, UNSPECIFIED CHRONICITY: ICD-10-CM

## 2025-07-07 DIAGNOSIS — M25.552 LEFT HIP PAIN: ICD-10-CM

## 2025-07-07 PROCEDURE — 97530 THERAPEUTIC ACTIVITIES: CPT

## 2025-07-07 PROCEDURE — 97112 NEUROMUSCULAR REEDUCATION: CPT

## 2025-07-07 NOTE — PROGRESS NOTES
Daily Note     Today's date: 2025  Patient name: Salome Avalos  : 1950  MRN: [id number]  Referring provider: Geri Gonzalez PA-C  Dx:   Encounter Diagnosis     ICD-10-CM    1. Sciatica of left side  M54.32       2. Left hip pain  M25.552       3. Left knee pain, unspecified chronicity  M25.562           Start Time: 1706          Subjective: Patient reports a 4/10 pain in the R knee and R buttock today.      Objective: See treatment diary below      Assessment: Salome tolerated today's treatment session well. Salome completed all exercises with no adverse reactions. Patient education provided on continued plan of care, updated HEP with printout given, and progression of POC and exercises. Patient required verbal cueing for proper exercise form. Progressed RLE strength training today with addition of exercises and progressions of reps and weight. Salome continues to benefit from skilled OPPT services to address range of motion deficits and BLE strength deficits. PT will continue to address functional deficits and focus on progression of POC per patient tolerance.      Patient performed Nustep to increase blood flow to the area being treated, prepare the muscles for strength training and stretching, improve overall tolerance to activity, and aerobic endurance.       Plan: Continue per plan of care.  Progress treatment as tolerated.       Precautions: n/a      Manuals       #1 #2 #3 #4 #5 #6 #7 #8                                            Neuro Re-Ed             Education   HEP/POC/biomechanics/pathophysiology MF- HEP  MF- HEP update  MF  HEP update, heat/ice use MF  HEP update MF   HEP update     Cone Step Overs NV 4 laps with 1 HHA 3 laps with no HHA          Lateral Cone Step Overs  4 laps with no hands 3 laps with no HHA          Sidestepping w/ Blaze Pods    3 x :30 3 x :30        SLS with Blaze Pods    4 x :30 4 x :30        Obstacle Course    Cone step  "overs, tandem walk on airex, Bosu step up x 2 full laps Tandem walking on airex with cones, 6\" step up, diagonal airex steps        TKE     Purple 2 x 10 LLE        Hip HIkes     1 x 15 b/l        Marching Walking       2 laps without HHA, 2# ankle weights       Supine SLR        2 x 10 BLE     LAQ        4# 2 x 10 BLE     Ther Ex             Std Hip ABD HEP 2 x 10 2 x 10    2 x 10 b/l 2#      Supine Figure 4 Stretch HEP 5 x :15 b/l HEP   5 x :20 b/l       Seated Figure 4 Stretch HEP 5 x :15 b/l HEP 10 x :10         Supine SLR HEP 2 x 10           Knee Flexion Stretch on Step NV 10 x :05 L Knee 10 x :05 L Knee  10 x :05 L Knee  10 x :05 L Knee      Calf Board  10 x :10 10 x :10 10 x :10 10 x :10  10 x: 10      Std HS Curls  2 x 10 2 x 10    2 x 10 b/l, 2#      Std HS Stretch      5 x :20 b/l 5 x :20 b/l      DKTC with pball and LTRS w/ pball      2 min ea  2 min DKTC only     Ther Activity             Nustep NV Seat 6, Lv 7, 8' Seat 6, Lv 7, 8' Seat 6, Lv 5, 8' Seat 6, Lv 5, 8' Seat 6, Lv 1, 8' Seat 6, Lv 1, 8'      Leg Press  #65 2 x 10 75# 2 x 10 85# 2 x 10 85# 2 x 10  75# 2 x 10, 85# 1 x 10 Seat 6, 85# 1 x 10 BLE, 95# 2 x 10 BLE    RLE only 45# 2 x 10     Step Ups  2 x 10 on 6\" 8\" with 1 HHA 2 x 10 ea  8\" with 1 HHA 3 x 10 ea   8\" with 1 HHA 2 x 15     Sidestepping  Yellow CO x 3 short laps with HHA Yellow CO x 3 short laps with HHA Yellow CO x 3 short laps with HHA Yellow CO x 3 short laps with HHA  Given as HEP Yellow COB x 3 short laps with HHA     Anti-Rotation Walkouts   Purple COB 1 x 10 HEP         Step Downs     Pain with 2\"        TRX Squats        2 x 10     Gait Training                                       Modalities             MH      During Nustep                                            "

## 2025-07-07 NOTE — HOME EXERCISE EDUCATION
Program_ID:[id number]   Access Code: [email or web address]  URL: [email or web address]  Date: 07-  Prepared By: Gordy Mohan    Program Notes      Exercises      - Supine Figure 4 Piriformis Stretch - 2 x daily - 7 x weekly - 1 sets - 5 reps - :20 hold      - Seated Piriformis Stretch with Trunk Bend - 2 x daily - 7 x weekly - 1 sets - 5 reps - :20 hold      - Standing Hamstring Stretch with Step - 2 x daily - 7 x weekly - 5 sets - 5 reps - :20 hold      - Supine Lumbar Rotation with Swiss Ball - 2 x daily - 7 x weekly - 3 sets - 10 reps      - Supine Knees to Chest with Swiss Ball - 1 x daily - 7 x weekly - 3 sets - 10 reps      - Standing Hip Abduction with Counter Support - 1 x daily - 3-4 x weekly - 2 sets - 10 reps      - Side Stepping with Resistance at Ankles - 1 x daily - 3-4 x weekly - 1 sets - 3 reps      - Supine Active Straight Leg Raise - 1 x daily - 3-4 x weekly - 2 sets - 10 reps      - Seated Long Arc Quad - 1 x daily - 3-4 x weekly - 2 sets - 10 reps

## 2025-07-17 ENCOUNTER — OFFICE VISIT (OUTPATIENT)
Age: 75
End: 2025-07-17
Payer: COMMERCIAL

## 2025-07-17 DIAGNOSIS — M25.562 LEFT KNEE PAIN, UNSPECIFIED CHRONICITY: ICD-10-CM

## 2025-07-17 DIAGNOSIS — M54.31 RIGHT SIDED SCIATICA: ICD-10-CM

## 2025-07-17 DIAGNOSIS — M25.552 LEFT HIP PAIN: ICD-10-CM

## 2025-07-17 DIAGNOSIS — M25.561 RIGHT KNEE PAIN, UNSPECIFIED CHRONICITY: ICD-10-CM

## 2025-07-17 DIAGNOSIS — M54.32 SCIATICA OF LEFT SIDE: Primary | ICD-10-CM

## 2025-07-17 PROCEDURE — 97164 PT RE-EVAL EST PLAN CARE: CPT

## 2025-07-17 PROCEDURE — 97530 THERAPEUTIC ACTIVITIES: CPT

## 2025-07-17 PROCEDURE — 97112 NEUROMUSCULAR REEDUCATION: CPT

## 2025-07-17 NOTE — PROGRESS NOTES
PT Evaluation     Today's date: 2025  Patient name: Salome Avalos  : 1950  MRN: [id number]  Referring provider: Geri Gonzalez PA-C  Dx:   Encounter Diagnosis     ICD-10-CM    1. Sciatica of left side  M54.32       2. Left hip pain  M25.552       3. Left knee pain, unspecified chronicity  M25.562       4. Right sided sciatica  M54.31       5. Right knee pain, unspecified chronicity  M25.561           Start Time: 1614  Stop Time: 1700  Total time in clinic (min): 46 minutes    Assessment  Impairments: abnormal gait, abnormal muscle tone, abnormal or restricted ROM, activity intolerance, impaired balance, impaired physical strength, lacks appropriate home exercise program, pain with function and endurance  Functional limitations: going down stairs, sitting for prolonged periods of time    Assessment details: Salome Avalos is a 75 y.o. year old female who continues to present to outpatient physical therapy with an original diagnosis of LLE pain. Patient has resolved LLE symptoms but now has symptoms in her R piriformis and R knee over the past month. She currently has slight R Knee AROM deficits, decreasred BLE strength, impaired balance, and decreased overall functional capacity. However, she does state subjective improvement with a GROC score of 70% and shows improvements via objective testing such as MMTs. She still presents with the previously stated deficits which limits her ability with going down stairs, gardening, and sitting for a long period of time. Salome is currently functioning below their prior level of function and is a good rehab candidate who would continue to benefit from skilled outpatient physical therapy services to allow them to reach their goals and return to PLOF, participate more fully in daily activities, and have an improved quality of life.    Physical therapist assistant (PTA) may be utilized to administer treatments as appropriate and in accordance with  [facility] Physical Therapy Practice Act.  Barriers to therapy: Multiple treatment areas  Understanding of Dx/Px/POC: good     Prognosis: good    Goals  STG: To be completed in 4 weeks from 5/19/2025:  1. Salome will be independent with basic HEP to allow pt to complete exercises safely when not directly supervised during PT session (met)   2. Salome will report 50% improvement in symptoms compared to start of POC to indicate functional improvement and decrease level of disability (met)  3. Salome will report pain no worse than 4/10 at worst to indicate decreased pain level and improved tolerance to activity. (6/10)   4. Salome will tolerate 10 minutes of continuous activity at a time with no increase in pain to indicate improved tolerance to activity (met)  5. Salome will demonstrate ability to reciprocally negotiate steps without pain increase (intermittently able to do this based off of symptoms at present time)  6. Salome will improve BLE strength to at least 4+/5 to allow for greater participation with transfers, gait, and ADLs. (4/5 at lowest but most 4+/5 or 5/5)      LTG: To be completed in 8 weeks from 5/19/2025 or upon discharge from PT:  1. Salome will be independent with advanced home exercise program to allow patient to transition from physical therapy care to continue with plan of care at home without supervision from therapist and continue to progress with rehabilitation. (In progress)  2. Salome will report pain no worse than 2/10 at worst to indicate decreased pain level and improved tolerance to activity. (6/10)  3. Salome will report 75% improvement in symptoms compared to start of POC to indicate functional improvement and decrease level of disability.  (70%)  4. Salome will tolerate 30 minutes of consecutive activity at a time with no increase in symptoms to indicate improvement with functional mobility. (Intermittently)  5. Salome will demonstrate gross 5/5 strength in BLE  for improved stability of joint and indicate improvement in functional strength.  (4/5 at lowest but most 4+/5 or 5/5)  6. Salome will demonstrate improved L Knee ROM to 0-130 to allow for greater ability to negotiate stairs and perform leisure activities such as gardening. (L Knee- 2-135, R Knee 4-135)      Plan  Patient would benefit from: skilled physical therapy and PT eval  Planned modality interventions: biofeedback, cryotherapy, neuromuscular electric stimulation, TENS, thermotherapy: hydrocollator packs, unattended electrical stimulation and ultrasound    Planned therapy interventions: IASTM, joint mobilization, kinesiology taping, manual therapy, massage, Kaminski taping, balance, nerve gliding, neuromuscular re-education, strengthening, stretching, therapeutic activities, therapeutic exercise, therapeutic training, home exercise program, graded activity, gait training, functional ROM exercises, flexibility and abdominal trunk stabilization    Frequency: 1-2x week  Duration in weeks: 6  Plan of Care beginning date: 7/17/2025  Plan of Care expiration date: 8/28/2025  Treatment plan discussed with: patient      Subjective Evaluation    History of Present Illness  Mechanism of injury: Pt is a 75-year-old female presenting complaining of left leg pain that starts in the hip and can goes down the back and side of the leg and into the knee. She denies any specific mechanism of injury. Pain is reportedly ongoing for about 3 weeks now. She notes a hx of sciatica and feels these symptoms are similar. She has tried the chiropractor, ice, and advil with minimal relief. She notes her gait is affected. She admits that she did trip over a rug since her symptoms have started but denies any injury. She went to Urgent Care on 5/8 and was given a methylprednisone pack and a referral to OPPT. She reports that the steroid did help a lot but is wearing off.     7/17 Re-Evaluation: Patient reports a GROC improvement of 70%.  She states that going down steps are still the most difficult and sitting for a longer period of time. She also notes continued piriformis pain in the right side only with prolonged sitting.  Quality of life: good    Patient Goals  Patient goals for therapy: increased strength, decreased pain, independence with ADLs/IADLs, improved balance, return to sport/leisure activities and increased motion  Patient goal: be able to travel to without impairment and pain-free (in progress)  Pain  Current pain ratin  At best pain ratin  At worst pain ratin  Location: posterior R knee and R piriformis  Quality: pressure and tight  Relieving factors: ice (massage 1x/wk, HEP)  Aggravating factors: sitting and stair climbing (going down stairs, sit to stands, standing/walking > 10 minutes, gardening, getting in/out of car)    Social Support  Steps to enter house: yes  1  Stairs in house: yes   13  Lives in: multiple-level home  Lives with: alone    Exercise history: intends to start using treadmill and vibration machine    Treatments  Previous treatment: chiropractic and massage        Objective     Palpation   Left   Muscle spasm in the piriformis.   Tenderness of the piriformis.   Trigger point to piriformis.     Right   Tenderness of the piriformis.     Additional Palpation Details  Bilateral ITB tenderness to palpation with L>R    Tenderness     Left Hip   Tenderness in the sacroiliac joint.     Right Hip   No tenderness in the sacroiliac joint.     Lumbar Screen  Lumbar range of motion within normal limits.    Active Range of Motion     Lumbar   Extension:  WFL  Left lateral flexion:  WFL  Right lateral flexion:  WFL  Left rotation:  WFL  Right rotation:  WFL  Left Hip   Flexion: WFL    Right Hip   Flexion: WFL  Left Knee   Flexion: 135 degrees   Extension: 2 degrees     Right Knee   Flexion: 136 degrees   Extension: 4 degrees     Mobility   Patellar Mobility:   Left Knee   WFL: medial, lateral, superior and  inferior.     Right Knee   WFL: medial, lateral, superior and inferior    Joint Play   Left Hip   Joints within functional limits are the long axis distraction.     Additional Joint Play Details  LAD on the LLE relieved back tightness    Strength/Myotome Testing     Left Hip   Planes of Motion   Flexion: 5  Extension: 4+  Abduction: 4+  External rotation: 4-  Internal rotation: 5    Right Hip   Planes of Motion   Flexion: 4+  Extension: 4-  Abduction: 4  External rotation: 4+  Internal rotation: 4+    Left Knee   Flexion: 5  Extension: 4  Quadriceps contraction: good    Right Knee   Flexion: 5  Extension: 4  Quadriceps contraction: good    Tests     Lumbar     Left   Negative passive SLR.     Right   Negative passive SLR.     Left Hip   Positive piriformis.   Negative AKOSUA and FADIR.   90/90 SLR: Negative.   SLR: Negative.     Right Hip   Negative AKOSUA and FADIR.     Swelling     Left Knee Girth Measurement (cm)   Joint line: 39.5 cm    Right Knee Girth Measurement (cm)   Joint line: 39 cm    Ambulation     Ambulation: Level Surfaces   Ambulation with assistive device: independent    Ambulation: Stairs   Pattern: reciprocal  Pattern: non-reciprocal    Observational Gait   Gait: within functional limits   Walking speed and stride length within functional limits.              Precautions: n/a      Manuals 5/19 5/21 5/28 5/29 6/2 6/13 6/30 7/7 7/17     #1 #2 #3 #4 #5 #6 #7 #8 #9    Re-Evaluation                                       Neuro Re-Ed             Education   HEP/POC/biomechanics/pathophysiology - HEP  MF- HEP update  MF  HEP update, heat/ice use MF  HEP update MF   HEP update MF  HEP and POC update    Cone Step Overs NV 4 laps with 1 HHA 3 laps with no HHA          Lateral Cone Step Overs  4 laps with no hands 3 laps with no HHA          Sidestepping w/ Blaze Pods    3 x :30 3 x :30        SLS with Blaze Pods    4 x :30 4 x :30        Obstacle Course    Cone step overs, tandem walk on airex, Bosu step  "up x 2 full laps Tandem walking on airex with cones, 6\" step up, diagonal airex steps        TKE     Purple 2 x 10 LLE        Hip HIkes     1 x 15 b/l        Marching Walking       2 laps without HHA, 2# ankle weights       Supine SLR        2 x 10 BLE     LAQ        4# 2 x 10 BLE     Ther Ex             Std Hip ABD HEP 2 x 10 2 x 10    2 x 10 b/l 2#      Supine Figure 4 Stretch HEP 5 x :15 b/l HEP   5 x :20 b/l       Seated Figure 4 Stretch HEP 5 x :15 b/l HEP 10 x :10         Supine SLR HEP 2 x 10           Knee Flexion Stretch on Step NV 10 x :05 L Knee 10 x :05 L Knee  10 x :05 L Knee  10 x :05 L Knee      Calf Board  10 x :10 10 x :10 10 x :10 10 x :10  10 x: 10      Std HS Curls  2 x 10 2 x 10    2 x 10 b/l, 2#      Std HS Stretch      5 x :20 b/l 5 x :20 b/l      DKTC with pball and LTRS w/ pball      2 min ea  2 min DKTC only     Ther Activity             Nustep NV Seat 6, Lv 7, 8' Seat 6, Lv 7, 8' Seat 6, Lv 5, 8' Seat 6, Lv 5, 8' Seat 6, Lv 1, 8' Seat 6, Lv 1, 8'  Seat 6, Lv 3, 5 minutes    Leg Press  #65 2 x 10 75# 2 x 10 85# 2 x 10 85# 2 x 10  75# 2 x 10, 85# 1 x 10 Seat 6, 85# 1 x 10 BLE, 95# 2 x 10 BLE    RLE only 45# 2 x 10 Seat 6, 95# 2 x 10 BLE, 105# 1 x 10 BLE    RLE only 55# 2 x 10    Step Ups  2 x 10 on 6\" 8\" with 1 HHA 2 x 10 ea  8\" with 1 HHA 3 x 10 ea   8\" with 1 HHA 2 x 15     Sidestepping  Yellow CO x 3 short laps with HHA Yellow CO x 3 short laps with HHA Yellow CO x 3 short laps with HHA Yellow CO x 3 short laps with HHA  Given as HEP Yellow COB x 3 short laps with HHA     Anti-Rotation Walkouts   Purple COB 1 x 10 HEP         Step Downs     Pain with 2\"        TRX Squats        2 x 10     Gait Training                                       Modalities             MH      During Nustep                           "

## 2025-07-17 NOTE — HOME EXERCISE EDUCATION
Program_ID:[id number]   Access Code: [email or web address]  URL: [email or web address]  Date: 07-  Prepared By: Gordy Mohan    Program Notes      Exercises      - Supine Figure 4 Piriformis Stretch - 2 x daily - 7 x weekly - 1 sets - 5 reps - :20 hold      - Seated Piriformis Stretch with Trunk Bend - 2 x daily - 7 x weekly - 1 sets - 5 reps - :20 hold      - Supine Knees to Chest with Swiss Ball - 1 x daily - 7 x weekly - 3 sets - 10 reps      - Standing Hip Abduction with Counter Support - 1 x daily - 3-4 x weekly - 2 sets - 10 reps      - Side Stepping with Resistance at Ankles - 1 x daily - 3-4 x weekly - 1 sets - 3 reps      - Supine Active Straight Leg Raise - 1 x daily - 3-4 x weekly - 2 sets - 10 reps      - Seated Long Arc Quad - 1 x daily - 3-4 x weekly - 2 sets - 10 reps      - Hip Hiking on Step - 1 x daily - 3-4 x weekly - 2 sets - 10 reps

## 2025-07-24 ENCOUNTER — OFFICE VISIT (OUTPATIENT)
Age: 75
End: 2025-07-24
Payer: COMMERCIAL

## 2025-07-24 DIAGNOSIS — M54.31 RIGHT SIDED SCIATICA: ICD-10-CM

## 2025-07-24 DIAGNOSIS — M25.552 LEFT HIP PAIN: ICD-10-CM

## 2025-07-24 DIAGNOSIS — M25.561 RIGHT KNEE PAIN, UNSPECIFIED CHRONICITY: ICD-10-CM

## 2025-07-24 DIAGNOSIS — M54.32 SCIATICA OF LEFT SIDE: Primary | ICD-10-CM

## 2025-07-24 DIAGNOSIS — M25.562 LEFT KNEE PAIN, UNSPECIFIED CHRONICITY: ICD-10-CM

## 2025-07-24 PROCEDURE — 97530 THERAPEUTIC ACTIVITIES: CPT

## 2025-07-24 PROCEDURE — 97110 THERAPEUTIC EXERCISES: CPT

## 2025-07-24 NOTE — PROGRESS NOTES
Daily Note     Today's date: 2025  Patient name: Salome Avalos  : 1950  MRN: [id number]  Referring provider: Geri Gonzalez PA-C  Dx:   Encounter Diagnosis     ICD-10-CM    1. Sciatica of left side  M54.32       2. Right knee pain, unspecified chronicity  M25.561       3. Left hip pain  M25.552       4. Left knee pain, unspecified chronicity  M25.562       5. Right sided sciatica  M54.31           Start Time: 1700  Stop Time: 1745  Total time in clinic (min): 45 minutes    Subjective: Patient reports no pain today prior to PT and is feeling better. She states that she continues to go for a massage on  and was told by the masseuse that her piriformis feel much better.      Objective: See treatment diary below      Assessment: Salome tolerated today's treatment session well. Salome completed all exercises with no adverse reactions. Patient education provided on progression of POC and exercises. Patient required verbal cueing and demonstrations for proper exercise form. Progressed patient's program with addition of lateral step ups and also weight on the leg press. Salome continues to benefit from skilled OPPT services to address BLE strength deficits, balance impairment, and endurance limitations. PT will continue to address functional deficits and focus on progression of POC per patient tolerance.      Patient performed Nustep to increase blood flow to the area being treated, prepare the muscles for strength training and stretching, improve overall tolerance to activity, and aerobic endurance.       Plan: Continue per plan of care.  Progress treatment as tolerated.       Precautions: n/a      Manuals  6/ 7/    #1 #2 #3 #4 #5 #6 #7 #8 #9 #10   Re-Evaluation                                       Neuro Re-Ed             Education   HEP/POC/biomechanics/pathophysiology - HEP  - HEP update    HEP update, heat/ice use   HEP update    HEP  "update MF  HEP and POC update    Cone Step Overs NV 4 laps with 1 HHA 3 laps with no HHA          Lateral Cone Step Overs  4 laps with no hands 3 laps with no HHA          Sidestepping w/ Blaze Pods    3 x :30 3 x :30        SLS with Blaze Pods    4 x :30 4 x :30        Obstacle Course    Cone step overs, tandem walk on airex, Bosu step up x 2 full laps Tandem walking on airex with cones, 6\" step up, diagonal airex steps        TKE     Purple 2 x 10 LLE        Hip HIkes     1 x 15 b/l        Marching Walking       2 laps without HHA, 2# ankle weights       Supine SLR        2 x 10 BLE  2 x 10   LAQ        4# 2 x 10 BLE     Ther Ex             Std Hip ABD HEP 2 x 10 2 x 10    2 x 10 b/l 2#      Supine Figure 4 Stretch HEP 5 x :15 b/l HEP   5 x :20 b/l    3 x :20 b/l   Seated Figure 4 Stretch HEP 5 x :15 b/l HEP 10 x :10         Supine SLR HEP 2 x 10           Knee Flexion Stretch on Step NV 10 x :05 L Knee 10 x :05 L Knee  10 x :05 L Knee  10 x :05 L Knee      Calf Board  10 x :10 10 x :10 10 x :10 10 x :10  10 x: 10 10 x :10  10 x :10   Std HS Curls  2 x 10 2 x 10    2 x 10 b/l, 2#      Std HS Stretch      5 x :20 b/l 5 x :20 b/l      DKTC with pball and LTRS w/ pball      2 min ea  2 min DKTC only     Ther Activity             Nustep NV Seat 6, Lv 7, 8' Seat 6, Lv 7, 8' Seat 6, Lv 5, 8' Seat 6, Lv 5, 8' Seat 6, Lv 1, 8' Seat 6, Lv 1, 8'  Seat 6, Lv 3, 5 minutes Seat 6, Lv 3, 8 minutes   Leg Press  #65 2 x 10 75# 2 x 10 85# 2 x 10 85# 2 x 10  75# 2 x 10, 85# 1 x 10 Seat 6, 85# 1 x 10 BLE, 95# 2 x 10 BLE    RLE only 45# 2 x 10 Seat 6, 95# 2 x 10 BLE, 105# 1 x 10 BLE    RLE only 55# 2 x 10 Seat 6, 95# 2 x 10 BLE, 105# 1 x 10 BLE    RLE only 55# 2 x 10   Step Ups  2 x 10 on 6\" 8\" with 1 HHA 2 x 10 ea  8\" with 1 HHA 3 x 10 ea   8\" with 1 HHA 2 x 15  Lateral step ups 3 x 10 per side 8\" with HHA   Sidestepping  Yellow CO x 3 short laps with HHA Yellow CO x 3 short laps with HHA Yellow CO x 3 short laps with HHA Yellow CO " "x 3 short laps with HHA  Given as HEP Yellow COB x 3 short laps with HHA  Yellow COB x 3 short laps with HHA   Anti-Rotation Walkouts   Purple COB 1 x 10 HEP         Step Downs     Pain with 2\"        TRX Squats        2 x 10     Gait Training                                       Modalities             MH      During Nustep                                "

## 2025-07-31 ENCOUNTER — OFFICE VISIT (OUTPATIENT)
Age: 75
End: 2025-07-31
Payer: COMMERCIAL

## 2025-07-31 DIAGNOSIS — M54.31 RIGHT SIDED SCIATICA: ICD-10-CM

## 2025-07-31 DIAGNOSIS — M25.561 RIGHT KNEE PAIN, UNSPECIFIED CHRONICITY: ICD-10-CM

## 2025-07-31 DIAGNOSIS — M25.562 LEFT KNEE PAIN, UNSPECIFIED CHRONICITY: ICD-10-CM

## 2025-07-31 DIAGNOSIS — M54.32 SCIATICA OF LEFT SIDE: Primary | ICD-10-CM

## 2025-07-31 DIAGNOSIS — M25.552 LEFT HIP PAIN: ICD-10-CM

## 2025-07-31 PROCEDURE — 97112 NEUROMUSCULAR REEDUCATION: CPT

## 2025-07-31 PROCEDURE — 97530 THERAPEUTIC ACTIVITIES: CPT

## 2025-07-31 PROCEDURE — 97110 THERAPEUTIC EXERCISES: CPT

## 2025-08-07 ENCOUNTER — EVALUATION (OUTPATIENT)
Age: 75
End: 2025-08-07
Payer: COMMERCIAL

## 2025-08-07 DIAGNOSIS — M25.562 LEFT KNEE PAIN, UNSPECIFIED CHRONICITY: ICD-10-CM

## 2025-08-07 DIAGNOSIS — M54.31 RIGHT SIDED SCIATICA: ICD-10-CM

## 2025-08-07 DIAGNOSIS — M25.552 LEFT HIP PAIN: ICD-10-CM

## 2025-08-07 DIAGNOSIS — M25.561 RIGHT KNEE PAIN, UNSPECIFIED CHRONICITY: ICD-10-CM

## 2025-08-07 DIAGNOSIS — M54.32 SCIATICA OF LEFT SIDE: Primary | ICD-10-CM

## 2025-08-07 PROCEDURE — 97112 NEUROMUSCULAR REEDUCATION: CPT

## 2025-08-07 PROCEDURE — 97164 PT RE-EVAL EST PLAN CARE: CPT

## 2025-08-07 PROCEDURE — 97530 THERAPEUTIC ACTIVITIES: CPT
